# Patient Record
Sex: FEMALE | Race: WHITE | NOT HISPANIC OR LATINO | Employment: OTHER | URBAN - METROPOLITAN AREA
[De-identification: names, ages, dates, MRNs, and addresses within clinical notes are randomized per-mention and may not be internally consistent; named-entity substitution may affect disease eponyms.]

---

## 2024-05-19 NOTE — PROGRESS NOTES
Transfer Type: [facility]  Transfer Triage Note    Date of call: 05/19/24  Time of call: 2:43 PM    Current Patient Location:  [facility]  Current Level of Care: ED    Vitals:                      at    Diagnosis: Acute angle closure, clot in pupil, increased ocular pressure  Reason for requested transfer: Further diagnostic work up, management, and consultation for specialized care   Isolation Needs: None    Care everywhere has been updated and reviewed: No, Lm will work on it  Necessary images have been sent through PACS: No    If patient is transferring for specialty care or specific procedure, the specialist required has participated in the transfer call and agreed with need for transfer and anticipated timeline: Yes, Provider name: Dr. Pompa specialty with: Ophtho    Transfer accepted: Yes  Stability of Patient: Patient is vitally stable, with no critical labs, and will likely remain stable throughout the transfer process  Is the patient appropriate for [facility]? Yes  Level of Care Needed: Med Surg  Telemetry Needed:  Med (Remote) Telemetry, asymptomatic sinus sachin and on diuretic  Expected Time of Arrival for Transfer: 0-8 hours  Arrival Location:  [facility]     Recommendations for Management and Stabilization: Given    Additional Comments: 78 year old woman who was operated by [facility]. Got vitrectomy 5/17/2024, discharged 5/18/24. Started throwing up on ride back to [place] and lost vision in L eye. Dr. Colvin was then called and told patient to come into ED. In the ED reports no vision out of left eye and IOP measured at 90. Recs for transfer to [facility]. Per outside provider Dr. Colvin would like to take to ER (has privileges at [facility]). Has received 500mg of diamox, needs another dose at 1930 5/19/2024, doing q8hrs. Also got pred 40mg and 1 drop of atropine, cosopt, brimonidine (3 doses for both of the  last two). Discussed with optho on call (Dr. Pompa) who is aware of transfer and okay with transfer. Will send to [facility] with medicine primary and ophtho consult on arrival.     Paulino Hawk MD    Addendum:  Received update from SOC that pt will have transport set up in AM on 5/20.    Mami Hirsch MD

## 2024-05-20 ENCOUNTER — ANESTHESIA EVENT (OUTPATIENT)
Dept: SURGERY | Facility: CLINIC | Age: 79
DRG: 116 | End: 2024-05-20
Payer: MEDICARE

## 2024-05-20 ENCOUNTER — HOSPITAL ENCOUNTER (INPATIENT)
Facility: CLINIC | Age: 79
LOS: 2 days | Discharge: HOME | DRG: 116 | End: 2024-05-22
Attending: STUDENT IN AN ORGANIZED HEALTH CARE EDUCATION/TRAINING PROGRAM | Admitting: FAMILY MEDICINE
Payer: MEDICARE

## 2024-05-20 ENCOUNTER — ANESTHESIA (OUTPATIENT)
Dept: SURGERY | Facility: CLINIC | Age: 79
DRG: 116 | End: 2024-05-20
Payer: MEDICARE

## 2024-05-20 DIAGNOSIS — H31.302 SUPRACHOROIDAL HEMORRHAGE OF LEFT EYE: ICD-10-CM

## 2024-05-20 PROBLEM — H40.20X0 ANGLE-CLOSURE GLAUCOMA: Status: ACTIVE | Noted: 2024-05-20

## 2024-05-20 LAB
ALBUMIN SERPL BCG-MCNC: 3.8 G/DL (ref 3.5–5.2)
ALP SERPL-CCNC: 69 U/L (ref 40–150)
ALT SERPL W P-5'-P-CCNC: 19 U/L (ref 0–50)
ANION GAP SERPL CALCULATED.3IONS-SCNC: 10 MMOL/L (ref 7–15)
AST SERPL W P-5'-P-CCNC: 23 U/L (ref 0–45)
BILIRUB SERPL-MCNC: 0.4 MG/DL
BUN SERPL-MCNC: 16.4 MG/DL (ref 8–23)
CALCIUM SERPL-MCNC: 9.3 MG/DL (ref 8.8–10.2)
CHLORIDE SERPL-SCNC: 107 MMOL/L (ref 98–107)
CREAT SERPL-MCNC: 0.63 MG/DL (ref 0.51–0.95)
DEPRECATED HCO3 PLAS-SCNC: 20 MMOL/L (ref 22–29)
EGFRCR SERPLBLD CKD-EPI 2021: 89 ML/MIN/1.73M2
ERYTHROCYTE [DISTWIDTH] IN BLOOD BY AUTOMATED COUNT: 13.6 % (ref 10–15)
GLUCOSE SERPL-MCNC: 114 MG/DL (ref 70–99)
HCT VFR BLD AUTO: 40.7 % (ref 35–47)
HGB BLD-MCNC: 13.3 G/DL (ref 11.7–15.7)
MCH RBC QN AUTO: 31.5 PG (ref 26.5–33)
MCHC RBC AUTO-ENTMCNC: 32.7 G/DL (ref 31.5–36.5)
MCV RBC AUTO: 96 FL (ref 78–100)
PLATELET # BLD AUTO: 262 10E3/UL (ref 150–450)
POTASSIUM SERPL-SCNC: 4 MMOL/L (ref 3.4–5.3)
PROT SERPL-MCNC: 6.4 G/DL (ref 6.4–8.3)
RBC # BLD AUTO: 4.22 10E6/UL (ref 3.8–5.2)
SODIUM SERPL-SCNC: 137 MMOL/L (ref 135–145)
WBC # BLD AUTO: 9 10E3/UL (ref 4–11)

## 2024-05-20 PROCEDURE — 120N000002 HC R&B MED SURG/OB UMMC

## 2024-05-20 PROCEDURE — 93010 ELECTROCARDIOGRAM REPORT: CPT | Performed by: INTERNAL MEDICINE

## 2024-05-20 PROCEDURE — 99100 ANES PT EXTEME AGE<1 YR&>70: CPT | Performed by: ANESTHESIOLOGY

## 2024-05-20 PROCEDURE — 250N000025 HC SEVOFLURANE, PER MIN: Performed by: OPHTHALMOLOGY

## 2024-05-20 PROCEDURE — 258N000003 HC RX IP 258 OP 636

## 2024-05-20 PROCEDURE — 370N000017 HC ANESTHESIA TECHNICAL FEE, PER MIN: Performed by: OPHTHALMOLOGY

## 2024-05-20 PROCEDURE — 08B53ZZ EXCISION OF LEFT VITREOUS, PERCUTANEOUS APPROACH: ICD-10-PCS | Performed by: STUDENT IN AN ORGANIZED HEALTH CARE EDUCATION/TRAINING PROGRAM

## 2024-05-20 PROCEDURE — 65815 DRAINAGE OF EYE: CPT | Mod: LT | Performed by: OPHTHALMOLOGY

## 2024-05-20 PROCEDURE — C1814 RETINAL TAMP, SILICONE OIL: HCPCS | Performed by: OPHTHALMOLOGY

## 2024-05-20 PROCEDURE — 08933ZZ DRAINAGE OF LEFT ANTERIOR CHAMBER, PERCUTANEOUS APPROACH: ICD-10-PCS | Performed by: STUDENT IN AN ORGANIZED HEALTH CARE EDUCATION/TRAINING PROGRAM

## 2024-05-20 PROCEDURE — 250N000012 HC RX MED GY IP 250 OP 636 PS 637: Performed by: STUDENT IN AN ORGANIZED HEALTH CARE EDUCATION/TRAINING PROGRAM

## 2024-05-20 PROCEDURE — 250N000011 HC RX IP 250 OP 636: Performed by: OPHTHALMOLOGY

## 2024-05-20 PROCEDURE — 250N000013 HC RX MED GY IP 250 OP 250 PS 637: Performed by: STUDENT IN AN ORGANIZED HEALTH CARE EDUCATION/TRAINING PROGRAM

## 2024-05-20 PROCEDURE — 67108 REPAIR DETACHED RETINA: CPT | Mod: LT | Performed by: OPHTHALMOLOGY

## 2024-05-20 PROCEDURE — 80053 COMPREHEN METABOLIC PANEL: CPT

## 2024-05-20 PROCEDURE — 99222 1ST HOSP IP/OBS MODERATE 55: CPT | Mod: AI

## 2024-05-20 PROCEDURE — 67036 REMOVAL OF INNER EYE FLUID: CPT | Performed by: NURSE ANESTHETIST, CERTIFIED REGISTERED

## 2024-05-20 PROCEDURE — 272N000001 HC OR GENERAL SUPPLY STERILE: Performed by: OPHTHALMOLOGY

## 2024-05-20 PROCEDURE — 250N000009 HC RX 250: Performed by: OPHTHALMOLOGY

## 2024-05-20 PROCEDURE — 250N000011 HC RX IP 250 OP 636: Performed by: NURSE ANESTHETIST, CERTIFIED REGISTERED

## 2024-05-20 PROCEDURE — 93005 ELECTROCARDIOGRAM TRACING: CPT

## 2024-05-20 PROCEDURE — 272N000002 HC OR SUPPLY OTHER OPNP: Performed by: OPHTHALMOLOGY

## 2024-05-20 PROCEDURE — 36415 COLL VENOUS BLD VENIPUNCTURE: CPT

## 2024-05-20 PROCEDURE — 360N000077 HC SURGERY LEVEL 4, PER MIN: Performed by: OPHTHALMOLOGY

## 2024-05-20 PROCEDURE — 710N000010 HC RECOVERY PHASE 1, LEVEL 2, PER MIN: Performed by: OPHTHALMOLOGY

## 2024-05-20 PROCEDURE — 258N000003 HC RX IP 258 OP 636: Performed by: NURSE ANESTHETIST, CERTIFIED REGISTERED

## 2024-05-20 PROCEDURE — 250N000011 HC RX IP 250 OP 636: Performed by: STUDENT IN AN ORGANIZED HEALTH CARE EDUCATION/TRAINING PROGRAM

## 2024-05-20 PROCEDURE — 85027 COMPLETE CBC AUTOMATED: CPT

## 2024-05-20 PROCEDURE — 99207 PR NO BILLABLE SERVICE THIS VISIT: CPT | Performed by: STUDENT IN AN ORGANIZED HEALTH CARE EDUCATION/TRAINING PROGRAM

## 2024-05-20 PROCEDURE — 250N000009 HC RX 250: Performed by: NURSE ANESTHETIST, CERTIFIED REGISTERED

## 2024-05-20 PROCEDURE — 99100 ANES PT EXTEME AGE<1 YR&>70: CPT | Performed by: NURSE ANESTHETIST, CERTIFIED REGISTERED

## 2024-05-20 PROCEDURE — 999N000141 HC STATISTIC PRE-PROCEDURE NURSING ASSESSMENT: Performed by: OPHTHALMOLOGY

## 2024-05-20 PROCEDURE — 67036 REMOVAL OF INNER EYE FLUID: CPT | Performed by: ANESTHESIOLOGY

## 2024-05-20 DEVICE — SILIKON™ 1000 (PURIFIED POLYDIMETHYLSILOXANE) IS HIGHLY PURIFIED LONG CHAIN POLYDIMETHYLSILOXANE TRIMETHYLSILOXY TERMINATED SILICONE OIL. IT IS STERILE,NON-PYROGENIC, CLEAR, COLORLESS AND HAS A VISCOSITY OF 1000 CS. FOR USE AS A POST-OPERATIVE RETINAL TAMPONADE DURING VITREORETINAL SURGERY. SILIKON™ 1000IS COMPOSED OF SILICON, OXYGEN, CARBON AND HYDROGEN ATOMS. SILIKON™ 1000 IS IMMISCIBLE WITH AQUEOUS COMPONENTS AND IS RELATIVELY INERT MATERIAL WITHLITTLE BIOLOGICAL TOXICITY POTENTIAL.
Type: IMPLANTABLE DEVICE | Site: EYE | Status: FUNCTIONAL
Brand: SILIKON™

## 2024-05-20 RX ORDER — SODIUM CHLORIDE, SODIUM LACTATE, POTASSIUM CHLORIDE, CALCIUM CHLORIDE 600; 310; 30; 20 MG/100ML; MG/100ML; MG/100ML; MG/100ML
INJECTION, SOLUTION INTRAVENOUS CONTINUOUS PRN
Status: DISCONTINUED | OUTPATIENT
Start: 2024-05-20 | End: 2024-05-20

## 2024-05-20 RX ORDER — ACETAZOLAMIDE 500 MG/5ML
500 INJECTION, POWDER, LYOPHILIZED, FOR SOLUTION INTRAVENOUS EVERY 12 HOURS
Status: DISCONTINUED | OUTPATIENT
Start: 2024-05-21 | End: 2024-05-20

## 2024-05-20 RX ORDER — HYDROMORPHONE HYDROCHLORIDE 1 MG/ML
0.4 INJECTION, SOLUTION INTRAMUSCULAR; INTRAVENOUS; SUBCUTANEOUS EVERY 5 MIN PRN
Status: DISCONTINUED | OUTPATIENT
Start: 2024-05-20 | End: 2024-05-20 | Stop reason: HOSPADM

## 2024-05-20 RX ORDER — PREDNISONE 20 MG/1
40 TABLET ORAL DAILY
Status: DISCONTINUED | OUTPATIENT
Start: 2024-05-20 | End: 2024-05-20

## 2024-05-20 RX ORDER — KETOCONAZOLE 20 MG/ML
SHAMPOO TOPICAL DAILY
Status: DISCONTINUED | OUTPATIENT
Start: 2024-05-21 | End: 2024-05-21

## 2024-05-20 RX ORDER — LATANOPROST 50 UG/ML
1 SOLUTION/ DROPS OPHTHALMIC AT BEDTIME
Status: DISCONTINUED | OUTPATIENT
Start: 2024-05-20 | End: 2024-05-21

## 2024-05-20 RX ORDER — SODIUM CHLORIDE, SODIUM LACTATE, POTASSIUM CHLORIDE, CALCIUM CHLORIDE 600; 310; 30; 20 MG/100ML; MG/100ML; MG/100ML; MG/100ML
INJECTION, SOLUTION INTRAVENOUS CONTINUOUS
Status: DISCONTINUED | OUTPATIENT
Start: 2024-05-20 | End: 2024-05-20

## 2024-05-20 RX ORDER — PROCHLORPERAZINE 25 MG
12.5 SUPPOSITORY, RECTAL RECTAL EVERY 12 HOURS PRN
Status: DISCONTINUED | OUTPATIENT
Start: 2024-05-20 | End: 2024-05-22 | Stop reason: HOSPADM

## 2024-05-20 RX ORDER — HYDRALAZINE HYDROCHLORIDE 20 MG/ML
2.5-5 INJECTION INTRAMUSCULAR; INTRAVENOUS EVERY 10 MIN PRN
Status: DISCONTINUED | OUTPATIENT
Start: 2024-05-20 | End: 2024-05-20 | Stop reason: HOSPADM

## 2024-05-20 RX ORDER — ACETAMINOPHEN 650 MG/1
650 SUPPOSITORY RECTAL EVERY 4 HOURS PRN
Status: DISCONTINUED | OUTPATIENT
Start: 2024-05-20 | End: 2024-05-22 | Stop reason: HOSPADM

## 2024-05-20 RX ORDER — SODIUM CHLORIDE, SODIUM LACTATE, POTASSIUM CHLORIDE, CALCIUM CHLORIDE 600; 310; 30; 20 MG/100ML; MG/100ML; MG/100ML; MG/100ML
INJECTION, SOLUTION INTRAVENOUS CONTINUOUS
Status: DISCONTINUED | OUTPATIENT
Start: 2024-05-20 | End: 2024-05-20 | Stop reason: HOSPADM

## 2024-05-20 RX ORDER — FEXOFENADINE HCL 60 MG/1
60 TABLET, FILM COATED ORAL DAILY PRN
Status: DISCONTINUED | OUTPATIENT
Start: 2024-05-20 | End: 2024-05-22 | Stop reason: HOSPADM

## 2024-05-20 RX ORDER — FENTANYL CITRATE 50 UG/ML
INJECTION, SOLUTION INTRAMUSCULAR; INTRAVENOUS PRN
Status: DISCONTINUED | OUTPATIENT
Start: 2024-05-20 | End: 2024-05-20

## 2024-05-20 RX ORDER — APREPITANT 40 MG/1
40 CAPSULE ORAL ONCE
Status: COMPLETED | OUTPATIENT
Start: 2024-05-20 | End: 2024-05-20

## 2024-05-20 RX ORDER — HYDROMORPHONE HYDROCHLORIDE 1 MG/ML
0.2 INJECTION, SOLUTION INTRAMUSCULAR; INTRAVENOUS; SUBCUTANEOUS EVERY 5 MIN PRN
Status: DISCONTINUED | OUTPATIENT
Start: 2024-05-20 | End: 2024-05-20 | Stop reason: HOSPADM

## 2024-05-20 RX ORDER — ACETAMINOPHEN 325 MG/1
975 TABLET ORAL ONCE
Status: COMPLETED | OUTPATIENT
Start: 2024-05-20 | End: 2024-05-20

## 2024-05-20 RX ORDER — BRIMONIDINE TARTRATE 2 MG/ML
1 SOLUTION/ DROPS OPHTHALMIC 3 TIMES DAILY
Status: DISCONTINUED | OUTPATIENT
Start: 2024-05-20 | End: 2024-05-21

## 2024-05-20 RX ORDER — ACETAZOLAMIDE 500 MG/5ML
500 INJECTION, POWDER, LYOPHILIZED, FOR SOLUTION INTRAVENOUS EVERY 12 HOURS
Status: DISCONTINUED | OUTPATIENT
Start: 2024-05-20 | End: 2024-05-21

## 2024-05-20 RX ORDER — ONDANSETRON 2 MG/ML
4 INJECTION INTRAMUSCULAR; INTRAVENOUS EVERY 6 HOURS PRN
Status: DISCONTINUED | OUTPATIENT
Start: 2024-05-20 | End: 2024-05-22 | Stop reason: HOSPADM

## 2024-05-20 RX ORDER — CALCIUM CARBONATE 500 MG/1
1000 TABLET, CHEWABLE ORAL 4 TIMES DAILY PRN
Status: DISCONTINUED | OUTPATIENT
Start: 2024-05-20 | End: 2024-05-22 | Stop reason: HOSPADM

## 2024-05-20 RX ORDER — LIDOCAINE 40 MG/G
CREAM TOPICAL
Status: DISCONTINUED | OUTPATIENT
Start: 2024-05-20 | End: 2024-05-22 | Stop reason: HOSPADM

## 2024-05-20 RX ORDER — DORZOLAMIDE HYDROCHLORIDE AND TIMOLOL MALEATE 20; 5 MG/ML; MG/ML
1 SOLUTION/ DROPS OPHTHALMIC 2 TIMES DAILY
Status: DISCONTINUED | OUTPATIENT
Start: 2024-05-20 | End: 2024-05-21

## 2024-05-20 RX ORDER — BALANCED SALT SOLUTION 6.4; .75; .48; .3; 3.9; 1.7 MG/ML; MG/ML; MG/ML; MG/ML; MG/ML; MG/ML
SOLUTION OPHTHALMIC PRN
Status: DISCONTINUED | OUTPATIENT
Start: 2024-05-20 | End: 2024-05-20 | Stop reason: HOSPADM

## 2024-05-20 RX ORDER — ACETAZOLAMIDE 500 MG/5ML
500 INJECTION, POWDER, LYOPHILIZED, FOR SOLUTION INTRAVENOUS EVERY 8 HOURS
Status: DISCONTINUED | OUTPATIENT
Start: 2024-05-20 | End: 2024-05-20

## 2024-05-20 RX ORDER — PROCHLORPERAZINE MALEATE 5 MG
5 TABLET ORAL EVERY 6 HOURS PRN
Status: DISCONTINUED | OUTPATIENT
Start: 2024-05-20 | End: 2024-05-22 | Stop reason: HOSPADM

## 2024-05-20 RX ORDER — LABETALOL HYDROCHLORIDE 5 MG/ML
10 INJECTION, SOLUTION INTRAVENOUS
Status: DISCONTINUED | OUTPATIENT
Start: 2024-05-20 | End: 2024-05-20 | Stop reason: HOSPADM

## 2024-05-20 RX ORDER — NALOXONE HYDROCHLORIDE 0.4 MG/ML
0.1 INJECTION, SOLUTION INTRAMUSCULAR; INTRAVENOUS; SUBCUTANEOUS
Status: DISCONTINUED | OUTPATIENT
Start: 2024-05-20 | End: 2024-05-20 | Stop reason: HOSPADM

## 2024-05-20 RX ORDER — ATROPINE SULFATE 10 MG/ML
1 SOLUTION/ DROPS OPHTHALMIC DAILY
Status: DISCONTINUED | OUTPATIENT
Start: 2024-05-20 | End: 2024-05-20

## 2024-05-20 RX ORDER — ONDANSETRON 2 MG/ML
4 INJECTION INTRAMUSCULAR; INTRAVENOUS EVERY 30 MIN PRN
Status: DISCONTINUED | OUTPATIENT
Start: 2024-05-20 | End: 2024-05-20 | Stop reason: HOSPADM

## 2024-05-20 RX ORDER — PROPOFOL 10 MG/ML
INJECTION, EMULSION INTRAVENOUS CONTINUOUS PRN
Status: DISCONTINUED | OUTPATIENT
Start: 2024-05-20 | End: 2024-05-20

## 2024-05-20 RX ORDER — LIDOCAINE 40 MG/G
CREAM TOPICAL
Status: DISCONTINUED | OUTPATIENT
Start: 2024-05-20 | End: 2024-05-20 | Stop reason: HOSPADM

## 2024-05-20 RX ORDER — LIDOCAINE HYDROCHLORIDE 20 MG/ML
INJECTION, SOLUTION INFILTRATION; PERINEURAL PRN
Status: DISCONTINUED | OUTPATIENT
Start: 2024-05-20 | End: 2024-05-20

## 2024-05-20 RX ORDER — ATROPINE SULFATE 10 MG/ML
SOLUTION/ DROPS OPHTHALMIC PRN
Status: DISCONTINUED | OUTPATIENT
Start: 2024-05-20 | End: 2024-05-22 | Stop reason: HOSPADM

## 2024-05-20 RX ORDER — AMOXICILLIN 250 MG
1 CAPSULE ORAL 2 TIMES DAILY PRN
Status: DISCONTINUED | OUTPATIENT
Start: 2024-05-20 | End: 2024-05-22 | Stop reason: HOSPADM

## 2024-05-20 RX ORDER — FENTANYL CITRATE 50 UG/ML
50 INJECTION, SOLUTION INTRAMUSCULAR; INTRAVENOUS EVERY 5 MIN PRN
Status: DISCONTINUED | OUTPATIENT
Start: 2024-05-20 | End: 2024-05-20 | Stop reason: HOSPADM

## 2024-05-20 RX ORDER — AMOXICILLIN 250 MG
2 CAPSULE ORAL 2 TIMES DAILY PRN
Status: DISCONTINUED | OUTPATIENT
Start: 2024-05-20 | End: 2024-05-22 | Stop reason: HOSPADM

## 2024-05-20 RX ORDER — PROPOFOL 10 MG/ML
INJECTION, EMULSION INTRAVENOUS PRN
Status: DISCONTINUED | OUTPATIENT
Start: 2024-05-20 | End: 2024-05-20

## 2024-05-20 RX ORDER — ONDANSETRON 2 MG/ML
4 INJECTION INTRAMUSCULAR; INTRAVENOUS ONCE
Status: COMPLETED | OUTPATIENT
Start: 2024-05-20 | End: 2024-05-20

## 2024-05-20 RX ORDER — DEXAMETHASONE SODIUM PHOSPHATE 4 MG/ML
4 INJECTION, SOLUTION INTRA-ARTICULAR; INTRALESIONAL; INTRAMUSCULAR; INTRAVENOUS; SOFT TISSUE
Status: DISCONTINUED | OUTPATIENT
Start: 2024-05-20 | End: 2024-05-20 | Stop reason: HOSPADM

## 2024-05-20 RX ORDER — FENTANYL CITRATE 50 UG/ML
25 INJECTION, SOLUTION INTRAMUSCULAR; INTRAVENOUS EVERY 5 MIN PRN
Status: DISCONTINUED | OUTPATIENT
Start: 2024-05-20 | End: 2024-05-20 | Stop reason: HOSPADM

## 2024-05-20 RX ORDER — ACETAMINOPHEN 325 MG/1
650 TABLET ORAL EVERY 4 HOURS PRN
Status: DISCONTINUED | OUTPATIENT
Start: 2024-05-20 | End: 2024-05-22 | Stop reason: HOSPADM

## 2024-05-20 RX ORDER — KETOCONAZOLE 20 MG/G
CREAM TOPICAL DAILY
Status: DISCONTINUED | OUTPATIENT
Start: 2024-05-21 | End: 2024-05-21

## 2024-05-20 RX ORDER — ONDANSETRON 4 MG/1
4 TABLET, ORALLY DISINTEGRATING ORAL EVERY 30 MIN PRN
Status: DISCONTINUED | OUTPATIENT
Start: 2024-05-20 | End: 2024-05-20 | Stop reason: HOSPADM

## 2024-05-20 RX ORDER — DEXAMETHASONE SODIUM PHOSPHATE 4 MG/ML
INJECTION, SOLUTION INTRA-ARTICULAR; INTRALESIONAL; INTRAMUSCULAR; INTRAVENOUS; SOFT TISSUE PRN
Status: DISCONTINUED | OUTPATIENT
Start: 2024-05-20 | End: 2024-05-20

## 2024-05-20 RX ORDER — GLYCOPYRROLATE 0.2 MG/ML
INJECTION, SOLUTION INTRAMUSCULAR; INTRAVENOUS PRN
Status: DISCONTINUED | OUTPATIENT
Start: 2024-05-20 | End: 2024-05-20

## 2024-05-20 RX ORDER — ONDANSETRON 4 MG/1
4 TABLET, ORALLY DISINTEGRATING ORAL EVERY 6 HOURS PRN
Status: DISCONTINUED | OUTPATIENT
Start: 2024-05-20 | End: 2024-05-22 | Stop reason: HOSPADM

## 2024-05-20 RX ADMIN — GLYCOPYRROLATE 0.3 MG: 0.2 INJECTION, SOLUTION INTRAMUSCULAR; INTRAVENOUS at 19:50

## 2024-05-20 RX ADMIN — LATANOPROST 1 DROP: 50 SOLUTION OPHTHALMIC at 17:36

## 2024-05-20 RX ADMIN — PROPOFOL 50 MG: 10 INJECTION, EMULSION INTRAVENOUS at 19:42

## 2024-05-20 RX ADMIN — FENTANYL CITRATE 50 MCG: 50 INJECTION INTRAMUSCULAR; INTRAVENOUS at 19:41

## 2024-05-20 RX ADMIN — SODIUM CHLORIDE, POTASSIUM CHLORIDE, SODIUM LACTATE AND CALCIUM CHLORIDE 500 ML: 600; 310; 30; 20 INJECTION, SOLUTION INTRAVENOUS at 17:36

## 2024-05-20 RX ADMIN — FENTANYL CITRATE 25 MCG: 50 INJECTION INTRAMUSCULAR; INTRAVENOUS at 20:54

## 2024-05-20 RX ADMIN — DEXAMETHASONE SODIUM PHOSPHATE 4 MG: 4 INJECTION, SOLUTION INTRA-ARTICULAR; INTRALESIONAL; INTRAMUSCULAR; INTRAVENOUS; SOFT TISSUE at 20:00

## 2024-05-20 RX ADMIN — PROPOFOL 50 MG: 10 INJECTION, EMULSION INTRAVENOUS at 19:43

## 2024-05-20 RX ADMIN — LIDOCAINE HYDROCHLORIDE 100 MG: 20 INJECTION, SOLUTION INFILTRATION; PERINEURAL at 19:41

## 2024-05-20 RX ADMIN — SODIUM CHLORIDE, POTASSIUM CHLORIDE, SODIUM LACTATE AND CALCIUM CHLORIDE: 600; 310; 30; 20 INJECTION, SOLUTION INTRAVENOUS at 18:46

## 2024-05-20 RX ADMIN — PROPOFOL 20 MCG/KG/MIN: 10 INJECTION, EMULSION INTRAVENOUS at 19:59

## 2024-05-20 RX ADMIN — ACETAMINOPHEN 975 MG: 325 TABLET, FILM COATED ORAL at 18:45

## 2024-05-20 RX ADMIN — APREPITANT 40 MG: 40 CAPSULE ORAL at 18:45

## 2024-05-20 RX ADMIN — ONDANSETRON 4 MG: 2 INJECTION INTRAMUSCULAR; INTRAVENOUS at 21:55

## 2024-05-20 ASSESSMENT — COLUMBIA-SUICIDE SEVERITY RATING SCALE - C-SSRS
6. HAVE YOU EVER DONE ANYTHING, STARTED TO DO ANYTHING, OR PREPARED TO DO ANYTHING TO END YOUR LIFE?: NO
1. IN THE PAST MONTH, HAVE YOU WISHED YOU WERE DEAD OR WISHED YOU COULD GO TO SLEEP AND NOT WAKE UP?: NO
2. HAVE YOU ACTUALLY HAD ANY THOUGHTS OF KILLING YOURSELF IN THE PAST MONTH?: NO

## 2024-05-20 ASSESSMENT — ACTIVITIES OF DAILY LIVING (ADL)
ADLS_ACUITY_SCORE: 20
ADLS_ACUITY_SCORE: 35
ADLS_ACUITY_SCORE: 20
ADLS_ACUITY_SCORE: 21
ADLS_ACUITY_SCORE: 20

## 2024-05-20 ASSESSMENT — LIFESTYLE VARIABLES: TOBACCO_USE: 0

## 2024-05-20 ASSESSMENT — TONOMETRY: OS_IOP_MMHG: 90

## 2024-05-20 ASSESSMENT — ENCOUNTER SYMPTOMS
DYSRHYTHMIAS: 1
SEIZURES: 0

## 2024-05-20 NOTE — PLAN OF CARE
"[id number]    Plan of Care Reviewed With: patient    Overall Patient Progress: no change    Outcome Evaluation: Pt is A & O x4 on RA. Denies pain, nausea, chest pain & SOB. New R PIV placed intermittently infusing LR bolus otherwise SL. Pt is SBA d/t L eye vision loss, voids spontaneously, and uses call light appropriately.    Shift Updates  Pt is NPO.  Pt placed on continuous tele monitoring per new order.  1643- Tele monitoring alarming HR 39 for 2 seconds - per orders, MD notified and aware. New order for 12 lead EKG placed.  Tele removed for pt to go down to pre-op.  Pt did not have IV access upon arrival to unit - RN flyer contacted for one to be placed.  Unable to administer Diamox prior to pt leaving for pre-op as it was unavailable - re-timed administration time.    Vitals: /43 (BP Location: Right arm)   Pulse (!) 44   Temp 98.3  F (36.8  C) (Oral)   Resp 16   Ht 1.626 m (5' 4\")   Wt 56.3 kg (124 lb 1.9 oz)   SpO2 97%   BMI 21.30 kg/m      Plan: TBD. Continue with plan of care.    1810- Report given to pre-op nurse. Pre-op labs obtained. Unable to complete CHG bath prior to. Pt voided prior to going down to pre-op    "

## 2024-05-20 NOTE — LETTER
Transition Communication Hand-off for Care Transitions to Next Level of Care Provider    Name: Mary Harding  : 1945  MRN #: [id number]  Primary Care Provider: Griffin Schwab     Primary Clinic: [facility] [address] [place] [id number]     Reason for Hospitalization:  increased ocular pressure, acute angle closure, clot in pupil  Angle-closure glaucoma  Admit Date/Time: 2024  2:42 PM  Discharge Date: 24  Payor Source: Payor: MEDICARE / Plan: MEDICARE / Product Type: Medicare /     Reason for Communication Hand-off Referral: Other Continuation of Care    Discharge Plan: Discharge to Hotel until she can return home.    Concern for non-adherence with plan of care:   Y/N No  Discharge Needs Assessment:  Needs      Flowsheet Row Most Recent Value   Equipment Currently Used at Home none          Follow-up specialty is recommended: Yes    Follow-up plan:    Future Appointments   Date Time Provider Department Center   2024  9:00 AM Gurwinder Garcia [facility]     Any outstanding tests or procedures:        Key Recommendations:      BASSAM Barber    AVS/Discharge Summary is the source of truth; this is a helpful guide for improved communication of patient story

## 2024-05-20 NOTE — CONSULTS
OPHTHALMOLOGY CONSULT NOTE  05/20/24    Patient: Mary Harding      ASSESSMENT/PLAN:   #Gas bubble expansion, left eye  Mary Harding is a 78 year old female who presented with a left eye increased IOP, lens and iridocorneal touch after undergoing vitrectomy for macular hole repair on 5/17/24. She experienced complete vision loss, eye pain, and nausea the day after surgery. No vision in this eye for the past 48 hours. Transferred from [place], [place] for evaluation.    Vision is no light perception in the left eye. IOP is unreadable with tonopen. The AC is completely flat with iris-cornea touch. B-scan shows decreased signal due to air in vitreous cavity     Exam is consistent with gas bubble expansion vs suprachoroidal hemorrhage (less likely given the b scan and exam findings) in the left eye. Discussed with patient poor prognosis of this condition. She would like to do everything possible to regain vision in the left eye.    Recommendations:  - NPO (has not eaten since yesterday at 4:30 pm)  - go to OR emergently for PPV with Dr. Espinoza  - start IV Diamox 500 mg BID  - start Latanoprost at bedtime left eye  - start Cosopt BID left eye  - start Brimonidine TID left eye      #Amblyopia, right eye  - since childhood. Unsure of best corrected vision. Treated with patching as a child. No history of strabismus surgery.    #Dry age-related macular degeneration, both eyes  - on AREDS  - right eye with moderate to severe amount of drusen on dilated exam    #Pseudophakia, bilateral      It is our pleasure to participate in this patient's care and treatment. Please contact us with any further questions or concerns.    Discussed with Retina fellow Dr. Dg Khoury and Staff Dr. Espinoza who agreed with this assessment and plan.     Nicky Benitez MD   PGY-3 Ophthalmology resident    HISTORY OF PRESENTING ILLNESS:     Mary Harding is a 78 year old female who presented on 5/20/24 with left eye decreased vision and  pain. She underwent a vitrectomy on Friday 5/17/24 with Dr. Hi at [facility] for left eye macular hole repair. She did well overnight and then was seen on POD1 in the morning and then while she was driving back to [facility] she experienced worsening left eye pain, decreased vision, and nausea. The last time she had any light perception was on Saturday afternoon at 3pm. She then stayed at home overnight but her pain was not improving and she went to the ER in [facility] where her IOP was elevated to 90. She was admitted and started on topical drops and IV Diamox. Her eye pain resolved mostly until late this afternoon it started to come back. She last got Diamox this morning around 8 or 9 per patient. She last ate yesterday at 4:30 pm and last drank this morning around 8 am.      10+ review of systems were otherwise negative except for that which has been stated above.      OCULAR/MEDICAL/SURGICAL HISTORIES:     Past Ocular History: Amblyopia right eye due to strabismus (no hx of strab surgery), pseudophakia each eye, dry AMD on AREDS  Eye Drops: none prior to macular hole repair  Pertinent Systemic Medications:   Current Facility-Administered Medications   Medication Dose Route Frequency Provider Last Rate Last Admin    acetaminophen (TYLENOL) tablet 650 mg  650 mg Oral Q4H PRN Mireya Vizcarra DO        Or    acetaminophen (TYLENOL) Suppository 650 mg  650 mg Rectal Q4H PRN Mireya Vizcarra DO        acetaZOLAMIDE (DIAMOX) injection 500 mg  500 mg Intravenous Q12H Mireya Vizcarra DO        brimonidine (ALPHAGAN) 0.2 % ophthalmic solution 1 drop  1 drop Left Eye TID Mireya Vizcarra DO        calcium carbonate (TUMS) chewable tablet 1,000 mg  1,000 mg Oral 4x Daily PRN Mireya Vizcarra DO        dorzolamide-timolol (COSOPT) ophthalmic solution 1 drop  1 drop Left Eye BID Mireya Vizcarra DO        fexofenadine (ALLEGRA) tablet 60 mg  60 mg Oral Daily PRN Mireya Vizcarra DO        lactated ringers BOLUS 500 mL  500 mL  Intravenous Once Mireya Vizcarra  mL/hr at 05/20/24 1736 500 mL at 05/20/24 1736    latanoprost (XALATAN) 0.005 % ophthalmic solution 1 drop  1 drop Left Eye At Bedtime Viviana Benitez MD   1 drop at 05/20/24 1736    lidocaine (LMX4) cream   Topical Q1H PRN Mireya Vizcarra DO        lidocaine 1 % 0.1-1 mL  0.1-1 mL Other Q1H PRN Mireya Vizcarra DO        No Medication Sleep Aids for this Patient   Does not apply Continuous PRN Mireya Vizcarra DO        ondansetron (ZOFRAN ODT) ODT tab 4 mg  4 mg Oral Q6H PRN Mireya Vizcarra DO        Or    ondansetron (ZOFRAN) injection 4 mg  4 mg Intravenous Q6H PRN Mireya Vizcarra DO        prochlorperazine (COMPAZINE) injection 5 mg  5 mg Intravenous Q6H PRN Mireya Vizcarra DO        Or    prochlorperazine (COMPAZINE) tablet 5 mg  5 mg Oral Q6H PRN Mireya Vizcarra DO        Or    prochlorperazine (COMPAZINE) suppository 12.5 mg  12.5 mg Rectal Q12H PRN Mireya Vizcarra DO        senna-docusate (SENOKOT-S/PERICOLACE) 8.6-50 MG per tablet 1 tablet  1 tablet Oral BID PRN Mireya Vizcarra DO        Or    senna-docusate (SENOKOT-S/PERICOLACE) 8.6-50 MG per tablet 2 tablet  2 tablet Oral BID PRN Mireya Vizcarra DO        sodium chloride (PF) 0.9% PF flush 3 mL  3 mL Intracatheter Q8H Mireya Vizcarra DO        sodium chloride (PF) 0.9% PF flush 3 mL  3 mL Intracatheter q1 min prn Mireya Vizcarra DO         Not on blood thinners      Past Medical History:  Allergies    Past Surgical History:   Macular hole repair PPV 5/17/24  Cataract surgery both eyes    Family History:  Mother with glaucoma    Social History: lives in [facility], takes care of herself, friend Lakeisha is first contact    EXAMINATION:     Base Eye Exam       Visual Acuity (Snellen - Linear)         Right Left    Near cc 20/60 NLP              Tonometry (Tonopen, 4:26 PM)         Right Left    Pressure 16 error              Pupils         APD    Right     Left Yes by reverse              Visual Fields  (Counting fingers)         Left Right      Full    Restrictions Total superior temporal, inferior temporal, superior nasal, inferior nasal deficiencies               Extraocular Movement         Right Left     -1 -2 -2   -1  -2   -1 -- -1    -3 -3 -3   -3  -3   -3 -3 -3                 Neuro/Psych       Oriented x3: Yes    Mood/Affect: Normal              Dilation       Right eye: 2.5% Camilo Synephrine, 1.0% Mydriacyl @ 5:07 PM                  Slit Lamp and Fundus Exam       External Exam         Right Left    External Normal Normal              Slit Lamp Exam         Right Left    Lids/Lashes Normal Lid edema, protective ptosis    Conjunctiva/Sclera White and quiet Subconj heme and chemosis inferior 270 degrees    Cornea Clear Diffuse MCE, IOL-K touch    Anterior Chamber Deep and quiet Flat, IOL touching cornea, heme overlying IOL inferior 2/3    Iris Round and reactive Fixed, non-reactive    Lens PCIOL with open PCO     Anterior Vitreous Normal               Fundus Exam         Right Left    Disc Tilted No view    C/D Ratio 0.5     Macula Moderate amount of drusen and RPE changes     Vessels Normal     Periphery Normal, blonde fundus                     Labs/Studies/Imaging Performed  B-scan left eye: AC completley flat with cornea IOL touch, kissing choroidals     Nicky Benitez MD  Resident Physician, PGY-3  Department of Ophthalmology  05/20/24 4:12 PM   Pager: [phone]    Attending Physician Attestation:      I did not examine this patient at this encounter but I discussed the case and the management of this patient's care with the Resident/Fellow. I saw the patient later in the operating room. I edited the note and agree with the assessment and plan as stated above and agree with all of its relevant components.    Harsh Espinoza MD, PhD  , Vitreoretinal Surgery  Department of Ophthalmology  [facility]

## 2024-05-20 NOTE — PROGRESS NOTES
6MS ADMISSION    D: Patient admitted/transferred from [facility] via EMS stretcher for angle-closure glaucoma/L eye vision loss at 1427.    I: Upon arrival to the unit patient was oriented to room, unit, and call light. Patient s height, weight, and vital signs were obtained. Allergies reviewed and allergy band applied. Provider notified of patient s arrival on the unit. Adult AVS completed. Head to toe assessment completed. Education assessment completed. Care plan initiated.    A: Vital signs stable upon admission. Patient rates pain at 0/10. Two RN skin assessment completed with RAH Gaytan with no significant findings.    P: Continue to monitor patient and intervene as needed. Continue with plan of care. Notify provider with any concerns or changes in patient status.

## 2024-05-20 NOTE — ANESTHESIA PREPROCEDURE EVALUATION
Anesthesia Pre-Procedure Evaluation    Patient: Mary Harding   MRN: [id number] : 1945        Procedure : Procedure(s):  Vitrectomy parsplana with 25 gauge system sclerotomy          No past medical history on file.   No past surgical history on file.   Allergies   Allergen Reactions    Mildew     Mold       Social History     Tobacco Use    Smoking status: Not on file    Smokeless tobacco: Not on file   Substance Use Topics    Alcohol use: Not on file      Wt Readings from Last 1 Encounters:   24 56.3 kg (124 lb 1.9 oz)        Anesthesia Evaluation   Pt has had prior anesthetic. Type: General and MAC.    No history of anesthetic complications       ROS/MED HX  ENT/Pulmonary: Comment: # Seasonal allergies.   # Allergies to mildew  - On Allegra 180 mg Daily PRN   #Hx/o spontaneous pneumothorax at age ~28 that required chest tube placement x2. She eventually underwent pleurodesis.      (-) tobacco use   Neurologic:  - neg neurologic ROS  (-) no seizures and no CVA   Cardiovascular: Comment: # Asymptomatic sinus bradycardia, incidental finding during current admission  -- Per patient report, she has a healthy lifestyle and has never been told that she has a slow heart rate. Denies CP, SOB, syncope, lightheadedness.      (+)  - -   -  - -                        dysrhythmias, Other,             METS/Exercise Tolerance: >4 METS Comment:   Lives alone and does her own lawn work, heavy gardening, snow removal, and house chores. Walks 3+ miles daily with her dogs. Denies exertional dyspnea or chest pain.    Hematologic:  - neg hematologic  ROS     Musculoskeletal: Comment: S/p toe surgery - neg musculoskeletal ROS     GI/Hepatic: Comment: S/p cholecystectomy - neg GI/hepatic ROS     Renal/Genitourinary: Comment: S/p tubal ligation.      Endo:       Psychiatric/Substance Use:  - neg psychiatric ROS     Infectious Disease:  - neg infectious disease ROS     Malignancy:  - neg malignancy ROS     Other: Comment:  "  # S/p vitrectomy at [facility] on 5/17/24  -- discharged on 5/18/24. Reports motion sickness and vomiting on the ride back to [place] due to face-down position.   -- Now with loss of vision in left eye: no vision out of left eye and IOP measured at 90  -- Concern for acute angle-closure glaucoma +/- intraocular bleeding   underwent a vitrectomy on 5/17/24. She was Dr. Colvin was then called and told patient to come into ED at [facility].  -- She has received 500 mg of Diamox q8hr. Also got prednisone 40mg, 1 drop of atropine, and 3 doses of Cosopt and brimonidine.           Physical Exam    Airway  airway exam normal      Mallampati: II   TM distance: > 3 FB   Neck ROM: full   Mouth opening: > 3 cm    Respiratory Devices and Support         Dental       (+) Minor Abnormalities - some fillings, tiny chips      Cardiovascular   cardiovascular exam normal          Pulmonary   pulmonary exam normal                OUTSIDE LABS:  CBC: No results found for: \"WBC\", \"HGB\", \"HCT\", \"PLT\"  BMP: No results found for: \"NA\", \"POTASSIUM\", \"CHLORIDE\", \"CO2\", \"BUN\", \"CR\", \"GLC\"  COAGS: No results found for: \"PTT\", \"INR\", \"FIBR\"  POC: No results found for: \"BGM\", \"HCG\", \"HCGS\"  HEPATIC: No results found for: \"ALBUMIN\", \"PROTTOTAL\", \"ALT\", \"AST\", \"GGT\", \"ALKPHOS\", \"BILITOTAL\", \"BILIDIRECT\", \"OZ\"  OTHER: No results found for: \"PH\", \"LACT\", \"A1C\", \"TORI\", \"PHOS\", \"MAG\", \"LIPASE\", \"AMYLASE\", \"TSH\", \"T4\", \"T3\", \"CRP\", \"SED\"    Anesthesia Plan    ASA Status:  3    NPO Status:  NPO Appropriate    Anesthesia Type: General.     - Airway: LMA   Induction: Intravenous.   Maintenance: Balanced.        Consents    Anesthesia Plan(s) and associated risks, benefits, and realistic alternatives discussed. Questions answered and patient/representative(s) expressed understanding.     - Discussed:     - Discussed with:  Patient      - Extended Intubation/Ventilatory Support Discussed: No.      - Patient is DNR/DNI Status: No   "   Use of blood products discussed: No .     Postoperative Care    Pain management: Oral pain medications, IV analgesics.   PONV prophylaxis: Dexamethasone or Solumedrol, Ondansetron (or other 5HT-3)     Comments:               Keisha Huff MD    I have reviewed the pertinent notes and labs in the chart from the past 30 days and (re)examined the patient.  Any updates or changes from those notes are reflected in this note.

## 2024-05-21 ENCOUNTER — OFFICE VISIT (OUTPATIENT)
Dept: OPHTHALMOLOGY | Facility: CLINIC | Age: 79
DRG: 116 | End: 2024-05-21
Attending: OPHTHALMOLOGY
Payer: MEDICARE

## 2024-05-21 DIAGNOSIS — Z48.810 AFTERCARE FOLLOWING SURGERY OF A SENSE ORGAN: Primary | ICD-10-CM

## 2024-05-21 LAB
ANION GAP SERPL CALCULATED.3IONS-SCNC: 14 MMOL/L (ref 7–15)
BASOPHILS # BLD AUTO: 0 10E3/UL (ref 0–0.2)
BASOPHILS NFR BLD AUTO: 0 %
BUN SERPL-MCNC: 13.3 MG/DL (ref 8–23)
CALCIUM SERPL-MCNC: 8 MG/DL (ref 8.8–10.2)
CHLORIDE SERPL-SCNC: 105 MMOL/L (ref 98–107)
CREAT SERPL-MCNC: 0.47 MG/DL (ref 0.51–0.95)
DEPRECATED HCO3 PLAS-SCNC: 16 MMOL/L (ref 22–29)
EGFRCR SERPLBLD CKD-EPI 2021: >90 ML/MIN/1.73M2
EOSINOPHIL # BLD AUTO: 0 10E3/UL (ref 0–0.7)
EOSINOPHIL NFR BLD AUTO: 0 %
ERYTHROCYTE [DISTWIDTH] IN BLOOD BY AUTOMATED COUNT: 13.5 % (ref 10–15)
GLUCOSE SERPL-MCNC: 76 MG/DL (ref 70–99)
HCT VFR BLD AUTO: 38.1 % (ref 35–47)
HGB BLD-MCNC: 12.2 G/DL (ref 11.7–15.7)
IMM GRANULOCYTES # BLD: 0 10E3/UL
IMM GRANULOCYTES NFR BLD: 0 %
LYMPHOCYTES # BLD AUTO: 1.1 10E3/UL (ref 0.8–5.3)
LYMPHOCYTES NFR BLD AUTO: 13 %
MCH RBC QN AUTO: 31.7 PG (ref 26.5–33)
MCHC RBC AUTO-ENTMCNC: 32 G/DL (ref 31.5–36.5)
MCV RBC AUTO: 99 FL (ref 78–100)
MONOCYTES # BLD AUTO: 0.7 10E3/UL (ref 0–1.3)
MONOCYTES NFR BLD AUTO: 8 %
NEUTROPHILS # BLD AUTO: 6.5 10E3/UL (ref 1.6–8.3)
NEUTROPHILS NFR BLD AUTO: 79 %
NRBC # BLD AUTO: 0 10E3/UL
NRBC BLD AUTO-RTO: 0 /100
PLATELET # BLD AUTO: 224 10E3/UL (ref 150–450)
POTASSIUM SERPL-SCNC: 3.9 MMOL/L (ref 3.4–5.3)
RBC # BLD AUTO: 3.85 10E6/UL (ref 3.8–5.2)
SODIUM SERPL-SCNC: 135 MMOL/L (ref 135–145)
TROPONIN T SERPL HS-MCNC: 6 NG/L
WBC # BLD AUTO: 8.2 10E3/UL (ref 4–11)

## 2024-05-21 PROCEDURE — 85025 COMPLETE CBC W/AUTO DIFF WBC: CPT

## 2024-05-21 PROCEDURE — 99024 POSTOP FOLLOW-UP VISIT: CPT | Performed by: OPHTHALMOLOGY

## 2024-05-21 PROCEDURE — 82374 ASSAY BLOOD CARBON DIOXIDE: CPT

## 2024-05-21 PROCEDURE — 250N000009 HC RX 250

## 2024-05-21 PROCEDURE — 93010 ELECTROCARDIOGRAM REPORT: CPT | Performed by: INTERNAL MEDICINE

## 2024-05-21 PROCEDURE — 84484 ASSAY OF TROPONIN QUANT: CPT

## 2024-05-21 PROCEDURE — 258N000003 HC RX IP 258 OP 636: Performed by: STUDENT IN AN ORGANIZED HEALTH CARE EDUCATION/TRAINING PROGRAM

## 2024-05-21 PROCEDURE — 93005 ELECTROCARDIOGRAM TRACING: CPT

## 2024-05-21 PROCEDURE — G0463 HOSPITAL OUTPT CLINIC VISIT: HCPCS | Performed by: OPHTHALMOLOGY

## 2024-05-21 PROCEDURE — 250N000011 HC RX IP 250 OP 636: Mod: JZ

## 2024-05-21 PROCEDURE — 99232 SBSQ HOSP IP/OBS MODERATE 35: CPT | Mod: GC

## 2024-05-21 PROCEDURE — 36415 COLL VENOUS BLD VENIPUNCTURE: CPT

## 2024-05-21 PROCEDURE — 120N000002 HC R&B MED SURG/OB UMMC

## 2024-05-21 RX ORDER — FEXOFENADINE HCL 180 MG/1
180 TABLET ORAL DAILY PRN
COMMUNITY

## 2024-05-21 RX ORDER — DORZOLAMIDE HYDROCHLORIDE AND TIMOLOL MALEATE 20; 5 MG/ML; MG/ML
1 SOLUTION/ DROPS OPHTHALMIC 2 TIMES DAILY
Status: ON HOLD | COMMUNITY
End: 2024-05-22

## 2024-05-21 RX ORDER — PREDNISOLONE ACETATE 10 MG/ML
1-2 SUSPENSION/ DROPS OPHTHALMIC 4 TIMES DAILY
Status: ON HOLD | COMMUNITY
End: 2024-05-22

## 2024-05-21 RX ORDER — SODIUM CHLORIDE, SODIUM LACTATE, POTASSIUM CHLORIDE, CALCIUM CHLORIDE 600; 310; 30; 20 MG/100ML; MG/100ML; MG/100ML; MG/100ML
INJECTION, SOLUTION INTRAVENOUS CONTINUOUS
Status: DISCONTINUED | OUTPATIENT
Start: 2024-05-21 | End: 2024-05-21

## 2024-05-21 RX ORDER — NEOMYCIN SULFATE, POLYMYXIN B SULFATE, AND DEXAMETHASONE 3.5; 10000; 1 MG/G; [USP'U]/G; MG/G
OINTMENT OPHTHALMIC AT BEDTIME
Status: DISCONTINUED | OUTPATIENT
Start: 2024-05-21 | End: 2024-05-22 | Stop reason: HOSPADM

## 2024-05-21 RX ORDER — PREDNISOLONE ACETATE 10 MG/ML
1 SUSPENSION/ DROPS OPHTHALMIC
Status: DISCONTINUED | OUTPATIENT
Start: 2024-05-21 | End: 2024-05-22 | Stop reason: HOSPADM

## 2024-05-21 RX ORDER — ATROPINE SULFATE 10 MG/ML
1 SOLUTION/ DROPS OPHTHALMIC 2 TIMES DAILY
Status: DISCONTINUED | OUTPATIENT
Start: 2024-05-21 | End: 2024-05-22 | Stop reason: HOSPADM

## 2024-05-21 RX ORDER — ZINC GLUCONATE 50 MG
50 TABLET ORAL DAILY
COMMUNITY

## 2024-05-21 RX ORDER — OFLOXACIN 3 MG/ML
1 SOLUTION/ DROPS OPHTHALMIC 4 TIMES DAILY
Status: DISCONTINUED | OUTPATIENT
Start: 2024-05-21 | End: 2024-05-22 | Stop reason: HOSPADM

## 2024-05-21 RX ADMIN — OFLOXACIN 1 DROP: 3 SOLUTION/ DROPS OPHTHALMIC at 16:29

## 2024-05-21 RX ADMIN — PREDNISOLONE ACETATE 1 DROP: 10 SUSPENSION/ DROPS OPHTHALMIC at 18:14

## 2024-05-21 RX ADMIN — ATROPINE SULFATE 1 DROP: 10 SOLUTION/ DROPS OPHTHALMIC at 22:08

## 2024-05-21 RX ADMIN — ACETAZOLAMIDE 500 MG: 500 INJECTION, POWDER, LYOPHILIZED, FOR SOLUTION INTRAVENOUS at 06:30

## 2024-05-21 RX ADMIN — OFLOXACIN 1 DROP: 3 SOLUTION/ DROPS OPHTHALMIC at 19:56

## 2024-05-21 RX ADMIN — SODIUM CHLORIDE, POTASSIUM CHLORIDE, SODIUM LACTATE AND CALCIUM CHLORIDE: 600; 310; 30; 20 INJECTION, SOLUTION INTRAVENOUS at 03:19

## 2024-05-21 RX ADMIN — PREDNISOLONE ACETATE 1 DROP: 10 SUSPENSION/ DROPS OPHTHALMIC at 22:08

## 2024-05-21 RX ADMIN — PREDNISOLONE ACETATE 1 DROP: 10 SUSPENSION/ DROPS OPHTHALMIC at 19:56

## 2024-05-21 RX ADMIN — PREDNISOLONE ACETATE 1 DROP: 10 SUSPENSION/ DROPS OPHTHALMIC at 16:37

## 2024-05-21 ASSESSMENT — ACTIVITIES OF DAILY LIVING (ADL)
ADLS_ACUITY_SCORE: 24
ADLS_ACUITY_SCORE: 23
ADLS_ACUITY_SCORE: 24
ADLS_ACUITY_SCORE: 23
ADLS_ACUITY_SCORE: 20
ADLS_ACUITY_SCORE: 23
ADLS_ACUITY_SCORE: 24
ADLS_ACUITY_SCORE: 23
ADLS_ACUITY_SCORE: 24
DEPENDENT_IADLS:: INDEPENDENT
ADLS_ACUITY_SCORE: 24
ADLS_ACUITY_SCORE: 20
ADLS_ACUITY_SCORE: 20
ADLS_ACUITY_SCORE: 24
ADLS_ACUITY_SCORE: 23
ADLS_ACUITY_SCORE: 23
ADLS_ACUITY_SCORE: 24
ADLS_ACUITY_SCORE: 20

## 2024-05-21 ASSESSMENT — CONF VISUAL FIELD
METHOD: COUNTING FINGERS
OS_INFERIOR_NASAL_RESTRICTION: 1
OS_SUPERIOR_NASAL_RESTRICTION: 1
OS_INFERIOR_TEMPORAL_RESTRICTION: 1
OS_SUPERIOR_TEMPORAL_RESTRICTION: 1

## 2024-05-21 ASSESSMENT — VISUAL ACUITY
OD_SC: 20/60
METHOD: SNELLEN - LINEAR
OD_SC+: -1
OS_SC: NLP

## 2024-05-21 ASSESSMENT — EXTERNAL EXAM - LEFT EYE: OS_EXAM: NORMAL

## 2024-05-21 ASSESSMENT — TONOMETRY
OD_IOP_MMHG: 10
OS_IOP_MMHG: 15
IOP_METHOD: ICARE

## 2024-05-21 NOTE — OR NURSING
"PACU to Inpatient Nursing Handoff    Patient Mary Harding is a 78 year old female who speaks Data Unavailable.   Procedure Procedure(s):  Vitrectomy parsplana with 25 gauge; endolaser, 1000 silikon oil placement, Anterior chamber reformation; air-fluid exchange; Eye Exam with AB Scanner   Surgeon(s) Primary: Harsh Landis MD  Fellow - Assisting: Dg Stone MD     Allergies   Allergen Reactions    Mildew     Mold        Isolation  [unfilled]     Past Medical History   has no past medical history on file.    Anesthesia General   Dermatome Level     Preop Meds acetaminophen (Tylenol) - time given: 1845  Emend 40 mg - time given: 1845   Nerve block Not applicable   Intraop Meds dexamethasone (Decadron)  fentanyl (Sublimaze): 75 mcg total  ondansetron (Zofran): last given at 2155   Local Meds Yes   Antibiotics Not applicable     Pain Patient Currently in Pain: no   PACU meds  Not applicable   PCA / epidural No   Capnography     Telemetry ECG Rhythm: Sinus bradycardia   Inpatient Telemetry Monitor Ordered? No        Labs Glucose Lab Results   Component Value Date     05/20/2024       Hgb Lab Results   Component Value Date    HGB 13.3 05/20/2024       INR No results found for: \"INR\"   PACU Imaging Not applicable     Wound/Incision Incision/Surgical Site 05/20/24 Left Eye (Active)   Incision Assessment UTV 05/20/24 2214   Closure Sutures 05/20/24 2152   Dressing Intervention Other (Comment) 05/20/24 2214   Number of days: 0      CMS        Equipment Not applicable   Other LDA       IV Access Peripheral IV 05/20/24 Anterior;Distal;Right Lower forearm (Active)   Site Assessment WDL 05/20/24 2214   Line Status Infusing 05/20/24 2214   Dressing Transparent 05/20/24 2214   Dressing Status clean;dry;intact 05/20/24 2214   Dressing Intervention New dressing  05/20/24 1725   Line Intervention Flushed 05/20/24 1830   Phlebitis Scale 0-->no symptoms 05/20/24 2214   Infiltration? no 05/20/24 2214 "   Number of days: 0      Blood Products Not applicable EBL 0 mL   Intake/Output Date 05/20/24 0700 - 05/21/24 0659   Shift 2844-7940 9185-1252 9890-6089 24 Hour Total   INTAKE   I.V.  900  900   Shift Total(mL/kg)  900(15.99)  900(15.99)   OUTPUT   Shift Total(mL/kg)       Weight (kg)  56.3 56.3 56.3      Drains / Rivas     Time of void PreOp Time of Void Prior to Procedure: 1814 (05/20/24 1851)    PostOp      Diapered? No   Bladder Scan     PO    water     Vitals    B/P: 101/49  T: 99.3  F (37.4  C)    Temp src: Axillary  P:  Pulse: (!) 41 (05/20/24 1845)          R: 22  O2:  SpO2: 100 %    O2 Device: Simple face mask (oral airway) (05/20/24 2214)    Oxygen Delivery: 8 LPM (05/20/24 2214)         Family/support present None here   Patient belongings     Patient transported on cart   DC meds/scripts (obs/outpt) Not applicable   Inpatient Pain Meds Released? No       Special needs/considerations None   Tasks needing completion None       Spring Sherman, RN

## 2024-05-21 NOTE — OP NOTE
PRE-OP Dx:    1)Ocular hypertension left eye   2) Retinal detachment left eye    Post-OP Dx: same    Attending: JOB Espinoza MD  Fellow: ERICKA Khoury MD    Anesthesia: General with block  Procedure:    1) AC washout   2) Pars plana vitrectomy (PPV) 25g   2) Silicon oil placement     EBL: scant  Specimens: none  Complications: none    Findings:    1) RD left eye     Procedure Description:      Mary Harding is a 78 year old  year old patient with a history of macular hole s/p repair, complicated with ocular hypertension left eye.  The patient was referred with an initial diagnosis of suprachoroidal hemorrhage. After informed consent was obtained, the patient was brought into the OR where general anesthesia was administered.      First, an ultrasound b scan was done that showed no suprachoroidals and showed a gas filled eye obliterating view to vitreous cavity. A 3 ml with a 27 G needle was used to remove 0.8 ml gas from the previously placed gas inside the eye. IOP became normal at this point.     The eye was then prepped and draped in the usual fashion for ophthalmic surgery. Next, a temporal paracentesis was created and a Lewicky cannula was placed in the AC that was flat with hyphema. The AC was deepened and hyphema that was not a lot but occupied the very flat space between the IOL and the cornea was washed. At this point, AC was deepened and remained stable. Attention was then turned to the vitrectomy.  Marks were made on the sclera inferotemporally, superotemporally, and superonasally 3.0 mm posterior to the limbus. The 25g transscleral cannulas were inserted through the sclera using the trocars.  The infusion cannula was connected to the inferotemporal cannula and directly visualized to verify it was in the correct location. It was noted that the retina is detached completely but the nasal retina was very elevated with nasal retina visible behind the lens. A retinotomy was created superonasally in the periphery  and subretinal gas was removed with the soft tip.     Next PFO was instilled into the eye and the retina flattened. A 3 clock hour retina tear with necrotic edges and subretinal blood around it was noted at superotemporal quadrant around equator. A posterior drainage retinotomy was created at nasal 9 o'clock. The eye was filled with PFO. Laser was placed around the tear and previous retinotomy sites, 360 degrees and around the previously treated temporal retinal tear.    Next a fluid air exchange was done with the soft-tip cannula and the light pipe.  A BSS rinse was done to verify all PFO was removed.  An air-oil exchange was done with 1000 cs silicon oil and the cannulas were removed.  The sclerotomies were sutured with 6-0 plain suture as needed and were tight.  The pressure was checked and verified to be appropriate.  A drop of atropine,  and maxitrol ointment were placed in the eye.  A pad and preciado shield were taped over the eye.    The patient left the OR with no complications.    I was present for the entire surgery.  Harsh Espinoza MD

## 2024-05-21 NOTE — CONSULTS
Care Management Initial Consult    General Information  Assessment completed with: Patient,    Type of CM/SW Visit: Initial Assessment    Primary Care Provider verified and updated as needed: Yes   Readmission within the last 30 days: no previous admission in last 30 days      Reason for Consult: discharge planning  Advance Care Planning: Advance Care Planning Reviewed: no concerns identified        Communication Assessment  Patient's communication style: spoken language (English or Bilingual)    Hearing Difficulty or Deaf: no   Wear Glasses or Blind: yes    Cognitive  Cognitive/Neuro/Behavioral: WDL  Level of Consciousness: alert  Arousal Level: opens eyes spontaneously  Orientation: oriented x 4     Best Language: 0 - No aphasia  Speech: spontaneous, clear    Living Environment:   People in home: alone     Current living Arrangements: house      Able to return to prior arrangements: yes     Family/Social Support:  Care provided by: self  Provides care for: pet(s)  Marital Status: Single  Children, Friend          Description of Support System: Supportive, Involved    Support Assessment: Adequate family and caregiver support, Adequate social supports    Current Resources:   Patient receiving home care services: No     Community Resources: None  Equipment currently used at home: none  Supplies currently used at home: None    Employment/Financial:  Employment Status: retired        Financial Concerns: none   Referral to Financial Worker: No     Does the patient's insurance plan have a 3 day qualifying hospital stay waiver?  No    Lifestyle & Psychosocial Needs:  Social Determinants of Health     Food Insecurity: Not on file   Depression: Not at risk (5/4/2024)    Received from [facility]    PHQ-2     PHQ-2 Total : 0   Housing Stability: Not on file   Tobacco Use: Low Risk  (5/19/2024)    Received from [facility] and [facility]    Patient History     Smoking Tobacco Use: Never     Smokeless  Tobacco Use: Never     Passive Exposure: Not on file   Financial Resource Strain: Not on file   Alcohol Use: Not At Risk (5/19/2024)    Received from [facility]    AUDIT-C     Frequency of Alcohol Consumption: Monthly or less     Average Number of Drinks: 1 or 2     Frequency of Binge Drinking: Never   Transportation Needs: Not on file   Physical Activity: Sufficiently Active (8/18/2020)    Received from [facility]    Exercise Vital Sign     Days of Exercise per Week: 7 days     Minutes of Exercise per Session: 60 min   Interpersonal Safety: Not on file   Stress: Not on file   Social Connections: Not on file   Health Literacy: Not on file     Functional Status:  Prior to admission patient needed assistance:   Dependent ADLs:: Independent  Dependent IADLs:: Independent  Assesssment of Functional Status: Not at baseline with ADL Functioning    Mental Health Status:  Mental Health Status: No Current Concerns       Chemical Dependency Status:  Chemical Dependency Status: No Current Concerns           Values/Beliefs:  Spiritual, Cultural Beliefs, Moravian Practices, Values that affect care: no             Additional Information:  Per H&P, patient is a 78 year old female admitted on 5/20/2024. She has no significant past medical history and is admitted for loss of vision in her left eye. Here for urgent evaluation by ophthalmology due to concern for acute angle-closure glaucoma.     Writer completed initial assessment due to no PCP on file. Writer introduced self, role and duties to patient. Patient reports that she resides in an independent home alone with pet brittany rodriguez. She states that she is independent at baseline with ADLs and IADLs. Patient says that her children (1 son in [place] and 2 daughters in [place]) are supportive. She also reports she has supportive friends and neighbors. Patient denies any mental health, chemical dependency or financial concerns.     She is a  retired school psychologist. Patient's primary care physician was added to her chart by writer. She does not have a Healthcare Directive and is not interested in completing at this time. Patient reports that she needs assistance with securing a Hotel room at discharge as she cannot fly until Monday. She states that her daughter is working on flying in to assist her in return home.  and/or RN Care Coordinator to continue to follow for support and discharge planning needs that arise.    BASSAM Barber, MSW  6th Floor Medical Surgical Unit  Phone [phone]

## 2024-05-21 NOTE — PROGRESS NOTES
"[facility]   BRIEF PROGRESS NOTE    SUBJECTIVE  Assessed for post-op check from vitrectomy with air fluid exchange and anterior chamber reformation for ocular hypertension of L eye and retinal detachment of L eye.     Mary is asleep initially and upon waking states she is not in pain but requests water. She states that overall she feels much better than before the procedure. She is drowsy from sleep and anesthesia but is oriented.    She states that she is thirsty as she has not eaten since 5/19 at about 16:30 and has drunk much less fluids than usual since then as well due to being NPO and not feeling well due to her eye pathology.      OBJECTIVE:  /57 (BP Location: Right arm)   Pulse (!) 49   Temp 98.5  F (36.9  C) (Oral)   Resp 17   Ht 1.626 m (5' 4\")   Wt 56.3 kg (124 lb 1.9 oz)   SpO2 98%   BMI 21.30 kg/m      Exam:   General: Alert and oriented, in no acute distress.  Skin: Warm and dry, no abnormalities noted.  Eyes: R eye: Extra-ocular muscles intact. L eye covered with dressing.  ENT: Speech intact, nasal passages open, no hearing impairment noted.  CV: No cyanosis or pallor, warm and well perfused.  Respiratory: No respiratory distress, no accessory muscle use.  Neuro: Comprehension intact. Gross and fine motor skills intact.   Psychiatric: Mood and affect appear normal.   Extremities: Warm, able to move all four extremities at will.      ASSESSMENT/PLAN:    # Ocular hypertension of L eye  # Retinal detachment of L eye  # S/p vitrectomy, air fluid exchange, anterior chamber reformation  Continue symptomatic cares  Tylenol  Zofran, compazine  Consider adding additional pain medications if pain increases on awakening  Continue eye cares as outlined in ophthalmology H&P from today  Start LR at 50 mL/hr for rehydration in context of low po intake over past few days    # History of LBBB  # Sinus bradycardia  Above noted on admission EKG, possible LBBB on this admission EKG. Plan to " reassess with below tests (both ordered) after patient has been out of surgery for a few hours so as to obtain accurate values.  EKG   Troponin     Please see daily rounding note for full A/P.  Clovis Haney MD  2:04 AM

## 2024-05-21 NOTE — PROGRESS NOTES
"  VS: Blood pressure 116/48, pulse (!) 46, temperature 98.1  F (36.7  C), temperature source Oral, resp. rate 16, height 1.626 m (5' 4\"), weight 56.3 kg (124 lb 1.9 oz), SpO2 95%.    O2: RoomAir   Output: Continent of bladder and bowel. Voided x 1 upon return from LEI   Last BM: 5/19/2024 per pt   Activity: Stand by assist    Skin: WDL   Pain: Patient denies pain    CMS: No Edema, denies Numbness and tingling   Dressing: Left eye post op dressing   Diet: Regular   LDA: Right PIV infusing LR @ 50 ml/hr   Plan: Continue Plan of care   Additional Info:        "

## 2024-05-21 NOTE — ANESTHESIA CARE TRANSFER NOTE
Patient: Mary Harding    Procedure: Procedure(s):  Vitrectomy parsplana with 25 gauge; endolaser, 1000 silikon oil placement, Anterior chamber reformation; air-fluid exchange; Eye Exam with AB Scanner       Diagnosis: Suprachoroidal hemorrhage of left eye [H31.302]  Diagnosis Additional Information: No value filed.    Anesthesia Type:   General     Note:    Oropharynx: spontaneously breathing and oral gastic tube in place  Level of Consciousness: drowsy  Oxygen Supplementation: face mask    Independent Airway: airway patency satisfactory and stable  Dentition: dentition unchanged      Patient transferred to: PACU    Handoff Report: Identifed the Patient, Identified the Reponsible Provider, Reviewed the pertinent medical history, Discussed the surgical course, Reviewed Intra-OP anesthesia mangement and issues during anesthesia, Set expectations for post-procedure period and Allowed opportunity for questions and acknowledgement of understanding      Vitals:  Vitals Value Taken Time   /49 05/20/24 2214   Temp 37.4  C (99.3  F) 05/20/24 2214   Pulse 50 05/20/24 2222   Resp 19 05/20/24 2222   SpO2 100 % 05/20/24 2222   Vitals shown include unfiled device data.    Electronically Signed By: ARTEMIO Webster CRNA  May 20, 2024  10:23 PM

## 2024-05-21 NOTE — ANESTHESIA PROCEDURE NOTES
Airway       Patient location during procedure: OR       Procedure Start/Stop Times: 5/20/2024 7:43 PM  Staff -        Anesthesiologist:  Keisha Granados MD       CRNA: Corinna Gilman APRN CRNA       Performed By: CRNA  Consent for Airway        Urgency: elective  Indications and Patient Condition       Indications for airway management: osmel-procedural       Induction type:intravenous       Mask difficulty assessment: 1 - vent by mask    Final Airway Details       Final airway type: supraglottic airway    Supraglottic Airway Details        Type: LMA       Brand: Air-Q       LMA size: 3    Post intubation assessment        Placement verified by: capnometry, equal breath sounds and chest rise        Number of attempts at approach: 1       Secured with: tape       Ease of procedure: easy       Dentition: Unchanged and Intact    Medication(s) Administered   Medication Administration Time: 5/20/2024 7:43 PM

## 2024-05-21 NOTE — UTILIZATION REVIEW
Admission Status; Secondary Review Determination       Under the authority of the Utilization Management Committee, the utilization review process indicated a secondary review on the above patient. The review outcome is based on review of the medical records, discussions with staff, and applying clinical experience noted on the date of the review.     (x) Inpatient Status Appropriate - This patient's medical care is consistent with medical management for inpatient care and reasonable inpatient medical practice.     RATIONALE FOR DETERMINATION   77 yo female who was urgently transferred from [facility] in [place], [place] to the [facility] for ophthalmology consultation and intervention. Patient had left vitrectomy surgery in [place] to left retina on Friday 5/17/24. Was able to go home Saturday 5/18/24, patient became car sick and had multiple episodes of vomiting.     On 5/20/24 she presented to the outside hospital ED with loss of vision in  left eye. Transfer requested for treatment of acute suprachoroidal hemorrhage of left eye. On 5/20/24 underwent the following surgery: Vitrectomy parsplana with 25 gauge; endolaser, 1000 silikon oil placement, Anterior chamber reformation; air-fluid exchange; Eye Exam with AB Scanner, Left - Eye. Although this may be classified as an outpatient surgery, the fact that it was urgent and required outs of state transfer for the high level of expertise needed, inpatient status is appropriate due to higher level of care and specialty ophthalmologic expertise needed. Not discharging home today 5/21/24.      At the time of admission with the information available to the attending physician more than 2 nights hospital complex care was anticipated, based on patient risk of adverse outcome if treated as outpatient and complex care required. Inpatient admission is appropriate based on the Medicare guidelines.     This document was produced using voice recognition  software.    The information on this document is developed by the utilization review team in order for the business office to ensure compliance. This only denotes the appropriateness of proper admission status and does not reflect the quality of care rendered.   The definitions of Inpatient Status and Observation Status used in making the determination above are those provided in the CMS Coverage Manual, Chapter 1 and Chapter 6, section 70.4.     Sincerely,   Thania Moreau MD  Utilization Review  Physician Advisor  [facility].

## 2024-05-21 NOTE — ANESTHESIA POSTPROCEDURE EVALUATION
Patient: Mary Harding    Procedure: Procedure(s):  Vitrectomy parsplana with 25 gauge; endolaser, 1000 silikon oil placement, Anterior chamber reformation; air-fluid exchange; Eye Exam with AB Scanner       Anesthesia Type:  General    Note:  Disposition: Inpatient   Postop Pain Control: Uneventful            Sign Out: Well controlled pain   PONV: No   Neuro/Psych: Uneventful            Sign Out: Acceptable/Baseline neuro status   Airway/Respiratory: Uneventful            Sign Out: Acceptable/Baseline resp. status   CV/Hemodynamics: Uneventful            Sign Out: Acceptable CV status; No obvious hypovolemia; No obvious fluid overload   Other NRE:    DID A NON-ROUTINE EVENT OCCUR?            Last vitals:  Vitals Value Taken Time   /66 05/20/24 2300   Temp 37.3  C (99.1  F) 05/20/24 2300   Pulse 55 05/20/24 2308   Resp 0 05/20/24 2309   SpO2 98 % 05/20/24 2311   Vitals shown include unfiled device data.    Electronically Signed By: Michael Isaac MD  May 20, 2024  11:42 PM

## 2024-05-21 NOTE — BRIEF OP NOTE
[facility]    Brief Operative Note    Pre-operative diagnosis: Suprachoroidal hemorrhage of left eye [H31.302]  Post-operative diagnosis Same as pre-operative diagnosis    Procedure: Vitrectomy parsplana with 25 gauge; endolaser, 1000 silikon oil placement, Anterior chamber reformation; air-fluid exchange; Eye Exam with AB Scanner, Left - Eye    Surgeon: Surgeons and Role:     * Harsh Landis MD - Primary     * Dg Stone MD - Fellow - Assisting  Anesthesia: General   Estimated Blood Loss: None    Drains: None  Specimens: * No specimens in log *  Findings:   None.  Complications: None.  Implants:   Implant Name Type Inv. Item Serial No.  Lot No. LRB No. Used Action   EYE SILIKON OIL 1000 8.5ML [id number] - [id number] Lens/Eye Implant EYE SILIKON OIL 1000 8.5ML [id number]  [id number] Left 1 Implanted   EYE [employer]IL[employer] OIL 1000 8.5ML [id number] - [id number] Lens/Eye Implant EYE SILIKON OIL 1000 8.5ML [id number]  [employer] Left 1 Implanted

## 2024-05-21 NOTE — PLAN OF CARE
"[id number]    Plan of Care Reviewed With: patient    Overall Patient Progress: no change    Outcome Evaluation: Pt is A & O x4 on RA. Denies pain, nausea, chest pain & SOB. Removed IV per pt request. Pt has L eye surigcal site - went for ophthalmology appt early in shift and has protective shield in place. Pt is SBA voids spontaneously, and uses call light appropriately.     Shift Updates  Pt left unit at 0920 via EMS wheelchair for eye clinic appointment. Pt taken off cardiac monitoring and continuous fluids - approved by MD prior to. Pt arrived back onto unit at BLANK 1220.   Cardiac monitoring and continuous IVF orders discontinued.    Vitals: /49 (BP Location: Right arm)   Pulse (!) 46   Temp 98.8  F (37.1  C) (Oral)   Resp 16   Ht 1.626 m (5' 4\")   Wt 56.3 kg (124 lb 1.9 oz)   SpO2 96%   BMI 21.30 kg/m      Plan: TBD. Continue with plan of care.      "

## 2024-05-21 NOTE — PLAN OF CARE
"  Problem: Adult Inpatient Plan of Care  Goal: Plan of Care Review  Description: The Plan of Care Review/Shift note should be completed every shift.  The Outcome Evaluation is a brief statement about your assessment that the patient is improving, declining, or no change.  This information will be displayed automatically on your shift  note.  Outcome: Progressing  Flowsheets (Taken 5/21/2024 1341)  Outcome Evaluation: Patient to stay in Hotel until able to fly back home.  Plan of Care Reviewed With: patient  Overall Patient Progress: no change     Problem: Adult Inpatient Plan of Care  Goal: Plan of Care Review  Description: The Plan of Care Review/Shift note should be completed every shift.  The Outcome Evaluation is a brief statement about your assessment that the patient is improving, declining, or no change.  This information will be displayed automatically on your shift  note.  Outcome: Progressing  Flowsheets (Taken 5/21/2024 1341)  Outcome Evaluation: Patient to stay in Hotel until able to fly back home.  Plan of Care Reviewed With: patient  Overall Patient Progress: no change  Goal: Patient-Specific Goal (Individualized)  Description: You can add care plan individualizations to a care plan. Examples of Individualization might be:  \"Parent requests to be called daily at 9am for status\", \"I have a hard time hearing out of my right ear\", or \"Do not touch me to wake me up as it startles  me\".  Outcome: Progressing  Goal: Absence of Hospital-Acquired Illness or Injury  Outcome: Progressing  Goal: Optimal Comfort and Wellbeing  Outcome: Progressing  Goal: Readiness for Transition of Care  Outcome: Progressing     Problem: Risk for Delirium  Goal: Optimal Coping  Outcome: Progressing  Goal: Improved Behavioral Control  Outcome: Progressing  Goal: Improved Attention and Thought Clarity  Outcome: Progressing  Goal: Improved Sleep  Outcome: Progressing     "

## 2024-05-21 NOTE — PROGRESS NOTES
Postoperative day 1 status post PPV/AC reformation/SO for gas overfill and RRD     Slept well  Retina attached  Doing well    Plan:  Position: face down  No heavy lifting   Shield at night  Retina detachment and endophthalmitis precautions were discussed with the patient and was asked to return if any of the those occur  Recommended to stay in town for another 3-4 days before being seen again  I checked and [facility] is NLP unfortunately; I know that retina is attached at this point; AC is deep but there is a significant amount of inflammation in AC that will improve with drops in the next few days but this should not cause an NLP vision; so I think we have had some damages to optic nerve; I am hoping that she recovers some vision in the next few days.     Medications to operative eye    Prednisolone (white or pink top) every 2 hours right eye   Ofloxacin (tan top) 4/day right eye   Atropine (red top) 2/day right eye   Maxitrol ointment at night     RTC 3 days with Dr. Khoury or Benedict Garcia       Complete documentation of historical and exam elements from today's encounter can be found in the full encounter summary report (not reduplicated in this progress note). I personally obtained the chief complaint(s) and history of present illness.  I confirmed and edited as necessary the review of systems, past medical/surgical history, family history, social history, and examination findings as documented by others; and I examined the patient myself. I personally reviewed the relevant tests, images, and reports as documented above. I formulated and edited as necessary the assessment and plan and discussed the findings and management plan with the patient and family.    Harsh Espinoza MD  , Vitreoretinal surgery   Department of Ophthalmology  [facility]

## 2024-05-21 NOTE — PROGRESS NOTES
[facility]    Progress Note - [facility] Family Medicine Service       Date of Admission:  5/20/2024    Updates from today:  - Discontinue Diamox 500 mg IV q12hr  - Discontinue Cosopt 1 drop BID in left eye  - Discontinue Brimonidine 1 drop TID in left eye  - Discontinue latanoprost 1 drop at bedtime  - Start Prednisolone (white or pink top) every 2 hours left eye   - Start Ofloxacin (tan top) QID left eye   - Start Atropine (red top) BID left eye   - Start Maxitrol ointment at night   - Discontinue LR infusion since tolerating PO    Assessment & Plan    Mary Harding is a 78 year old female admitted on 5/20/2024. She has no significant past medical history and is admitted for loss of vision in her left eye. Underwent repeat vitrectomy for suprachoroidal hemorrhage of left eye.      # S/p vitrectomy on 5/17/24  # Sudden-onset loss of vision in left eye  # Suprachoroidal hemorrhage of left eye  There is no access to outside records; therefore, chart review is limited. Loss of vision on 5/18/24 after vitrectomy on 5/17/24. Urgently underwent a vitrectomy parsplana, anterior chamber reformation, and air-fluid exchange. Mary was doing much better today and is recovering from the operation well.   Workup:  - Daily CBC, BMP  Management:  - Discontinue Diamox 500 mg IV q12hr  - Discontinue Cosopt 1 drop BID in left eye  - Discontinue Brimonidine 1 drop TID in left eye  - Discontinue latanoprost 1 drop at bedtime  - Start Prednisolone (white or pink top) every 2 hours left eye   - Start Ofloxacin (tan top) QID left eye   - Start Atropine (red top) BID left eye   - Start Maxitrol ointment at night   - S/p prednisone 40mg  - S/p 1 drop of atropine  - Fall precautions  Consult:  - Ophthalmology consult; appreciate recommendations     # Asymptomatic sinus bradycardia  # History of LBBB  Mary reports that she has a healthy lifestyle and has never been told that she has a slow  "heart rate. Denies CP, SOB, syncope, lightheadedness. Troponin WNL. EKG revealed sinus bradycardia.     # Allergies to mildew  - PTA Allegra 180 mg Daily PRN (She reports that she takes the \"standard dose\" of Allegra, although she does not exactly remember what it is)        Diet: Regular Diet Adult    DVT Prophylaxis: Low Risk/Ambulatory with no VTE prophylaxis indicated  Rivas Catheter: Not present  Fluids: PO  Lines: None     Cardiac Monitoring: None  Code Status:  CPR- Do NOT Intubate    Clinically Significant Risk Factors Present on Admission          # Hypocalcemia: Lowest Ca = 8 mg/dL in last 2 days, will monitor and replace as appropriate                         Disposition Plan     Expected Discharge Date: 05/22/2024                The patient's care was discussed with the Attending Physician, Dr. Caleb DO .    Mireya Vizcarra DO, BARAK  [facility]  [facility]  Securely message with SetMe[employer] (more info)  Text page via [facility] Paging/Directory   See signed in provider for up to date coverage information  ______________________________________________________________________    Interval History   Overnight: Postoperative check was within normal limits. EKG and troponin ordered to assess bradycardia and rule out ischemia.    Subjective: Mary reports that she is feeling much better. She was able to eat breakfast without issue. She was preparing to go to the ophthalmology appointment at the [facility] for follow up. Denies eye pain, headache, chest pain, and shortness of breath.     Physical Exam   Vital Signs: Temp: 98.8  F (37.1  C) Temp src: Oral BP: 129/49 Pulse: (!) 46   Resp: 16 SpO2: 96 % O2 Device: None (Room air) Oxygen Delivery: 6 LPM  Weight: 124 lbs 1.9 oz    Physical Exam  Vitals reviewed.   Constitutional:       Appearance: Normal appearance.   HENT:      Head: Normocephalic and atraumatic.   Eyes:      General:         Right eye: No " discharge.      Comments: Left eye bandaged.   Cardiovascular:      Rate and Rhythm: Regular rhythm. Bradycardia present.      Heart sounds: Normal heart sounds.   Pulmonary:      Effort: Pulmonary effort is normal.      Breath sounds: Normal breath sounds.   Musculoskeletal:         General: Normal range of motion.      Cervical back: Normal range of motion and neck supple.      Right lower leg: No edema.      Left lower leg: No edema.   Skin:     General: Skin is warm and dry.   Neurological:      General: No focal deficit present.      Mental Status: She is alert and oriented to person, place, and time.   Psychiatric:         Mood and Affect: Mood normal.         Behavior: Behavior normal.         Thought Content: Thought content normal.         Judgment: Judgment normal.       Medical Decision Making   Please see A&P for additional details of medical decision making.      Data     I have personally reviewed the following data over the past 24 hrs:    8.2  \   12.2   / 224     135 105 13.3 /  76   3.9 16 (L) 0.47 (L) \     ALT: 19 AST: 23 AP: 69 TBILI: 0.4   ALB: 3.8 TOT PROTEIN: 6.4 LIPASE: N/A     Trop: 6 BNP: N/A       Imaging results reviewed over the past 24 hrs:   No results found for this or any previous visit (from the past 24 hour(s)).

## 2024-05-21 NOTE — PHARMACY-ADMISSION MEDICATION HISTORY
Pharmacist Admission Medication History    Admission medication history is complete. The information provided in this note is only as accurate as the sources available at the time of the update.    Information Source(s): Patient and CareEverywhere/SureScripts via in-person    Pertinent Information:   Pt never started dorzolamide/timolol eye drops as she hadn't picked this up from the pharmacy yet    Allergies reviewed with patient and updates made in EHR: yes    Medication History Completed By: Jenniffer Bob [facility] 5/21/2024 2:27 PM    PTA Med List   Medication Sig Last Dose    calcium-vitamin D-vitamin K (VIACTIV) 500-500-40 MG-UNT-MCG CHEW Take 1 tablet by mouth daily Past Week    dorzolamide-timolol (COSOPT) 2-0.5 % ophthalmic solution Place 1 drop Into the left eye 2 times daily not yet started, never picked up prescription    fexofenadine (ALLEGRA) 180 MG tablet Take 180 mg by mouth daily as needed for allergies     prednisoLONE acetate (PRED FORTE) 1 % ophthalmic suspension Place 1-2 drops Into the left eye 4 times daily Past Week    zinc gluconate 50 MG tablet Take 50 mg by mouth daily Past Week

## 2024-05-22 VITALS
RESPIRATION RATE: 16 BRPM | OXYGEN SATURATION: 97 % | SYSTOLIC BLOOD PRESSURE: 134 MMHG | HEIGHT: 64 IN | DIASTOLIC BLOOD PRESSURE: 52 MMHG | BODY MASS INDEX: 21.19 KG/M2 | WEIGHT: 124.12 LBS | HEART RATE: 56 BPM | TEMPERATURE: 97.4 F

## 2024-05-22 PROBLEM — H40.20X0 ANGLE-CLOSURE GLAUCOMA: Status: RESOLVED | Noted: 2024-05-20 | Resolved: 2024-05-22

## 2024-05-22 PROBLEM — H31.302: Status: ACTIVE | Noted: 2024-05-22

## 2024-05-22 LAB
ANION GAP SERPL CALCULATED.3IONS-SCNC: 13 MMOL/L (ref 7–15)
ATRIAL RATE - MUSE: 41 BPM
ATRIAL RATE - MUSE: 43 BPM
BASOPHILS # BLD AUTO: 0 10E3/UL (ref 0–0.2)
BASOPHILS NFR BLD AUTO: 1 %
BUN SERPL-MCNC: 28.1 MG/DL (ref 8–23)
CALCIUM SERPL-MCNC: 9.2 MG/DL (ref 8.8–10.2)
CHLORIDE SERPL-SCNC: 105 MMOL/L (ref 98–107)
CREAT SERPL-MCNC: 0.74 MG/DL (ref 0.51–0.95)
DEPRECATED HCO3 PLAS-SCNC: 20 MMOL/L (ref 22–29)
DIASTOLIC BLOOD PRESSURE - MUSE: NORMAL MMHG
DIASTOLIC BLOOD PRESSURE - MUSE: NORMAL MMHG
EGFRCR SERPLBLD CKD-EPI 2021: 81 ML/MIN/1.73M2
EOSINOPHIL # BLD AUTO: 0 10E3/UL (ref 0–0.7)
EOSINOPHIL NFR BLD AUTO: 1 %
ERYTHROCYTE [DISTWIDTH] IN BLOOD BY AUTOMATED COUNT: 13.5 % (ref 10–15)
GLUCOSE SERPL-MCNC: 93 MG/DL (ref 70–99)
HCT VFR BLD AUTO: 43.6 % (ref 35–47)
HGB BLD-MCNC: 14.5 G/DL (ref 11.7–15.7)
IMM GRANULOCYTES # BLD: 0.1 10E3/UL
IMM GRANULOCYTES NFR BLD: 1 %
INTERPRETATION ECG - MUSE: NORMAL
INTERPRETATION ECG - MUSE: NORMAL
LYMPHOCYTES # BLD AUTO: 1.3 10E3/UL (ref 0.8–5.3)
LYMPHOCYTES NFR BLD AUTO: 17 %
MCH RBC QN AUTO: 31.3 PG (ref 26.5–33)
MCHC RBC AUTO-ENTMCNC: 33.3 G/DL (ref 31.5–36.5)
MCV RBC AUTO: 94 FL (ref 78–100)
MONOCYTES # BLD AUTO: 0.8 10E3/UL (ref 0–1.3)
MONOCYTES NFR BLD AUTO: 10 %
NEUTROPHILS # BLD AUTO: 5.6 10E3/UL (ref 1.6–8.3)
NEUTROPHILS NFR BLD AUTO: 70 %
NRBC # BLD AUTO: 0 10E3/UL
NRBC BLD AUTO-RTO: 0 /100
P AXIS - MUSE: 2 DEGREES
P AXIS - MUSE: 9 DEGREES
PLATELET # BLD AUTO: 268 10E3/UL (ref 150–450)
POTASSIUM SERPL-SCNC: 4 MMOL/L (ref 3.4–5.3)
PR INTERVAL - MUSE: 132 MS
PR INTERVAL - MUSE: 142 MS
QRS DURATION - MUSE: 120 MS
QRS DURATION - MUSE: 134 MS
QT - MUSE: 482 MS
QT - MUSE: 490 MS
QTC - MUSE: 404 MS
QTC - MUSE: 407 MS
R AXIS - MUSE: 58 DEGREES
R AXIS - MUSE: 68 DEGREES
RBC # BLD AUTO: 4.63 10E6/UL (ref 3.8–5.2)
SODIUM SERPL-SCNC: 138 MMOL/L (ref 135–145)
SYSTOLIC BLOOD PRESSURE - MUSE: NORMAL MMHG
SYSTOLIC BLOOD PRESSURE - MUSE: NORMAL MMHG
T AXIS - MUSE: 18 DEGREES
T AXIS - MUSE: 3 DEGREES
T4 FREE SERPL-MCNC: 1.57 NG/DL (ref 0.9–1.7)
TSH SERPL DL<=0.005 MIU/L-ACNC: 5.95 UIU/ML (ref 0.3–4.2)
VENTRICULAR RATE- MUSE: 41 BPM
VENTRICULAR RATE- MUSE: 43 BPM
WBC # BLD AUTO: 7.8 10E3/UL (ref 4–11)

## 2024-05-22 PROCEDURE — 93005 ELECTROCARDIOGRAM TRACING: CPT

## 2024-05-22 PROCEDURE — 99239 HOSP IP/OBS DSCHRG MGMT >30: CPT | Mod: GC

## 2024-05-22 PROCEDURE — 84439 ASSAY OF FREE THYROXINE: CPT

## 2024-05-22 PROCEDURE — 82374 ASSAY BLOOD CARBON DIOXIDE: CPT

## 2024-05-22 PROCEDURE — 250N000009 HC RX 250

## 2024-05-22 PROCEDURE — 85004 AUTOMATED DIFF WBC COUNT: CPT

## 2024-05-22 PROCEDURE — 93010 ELECTROCARDIOGRAM REPORT: CPT | Performed by: INTERNAL MEDICINE

## 2024-05-22 PROCEDURE — 84443 ASSAY THYROID STIM HORMONE: CPT

## 2024-05-22 PROCEDURE — 82565 ASSAY OF CREATININE: CPT

## 2024-05-22 PROCEDURE — 36415 COLL VENOUS BLD VENIPUNCTURE: CPT

## 2024-05-22 RX ORDER — ATROPINE SULFATE 10 MG/ML
1 SOLUTION/ DROPS OPHTHALMIC 2 TIMES DAILY
Qty: 15 ML | Refills: 0 | Status: SHIPPED | OUTPATIENT
Start: 2024-05-22

## 2024-05-22 RX ORDER — OFLOXACIN 3 MG/ML
1 SOLUTION/ DROPS OPHTHALMIC 4 TIMES DAILY
Qty: 10 ML | Refills: 0 | Status: SHIPPED | OUTPATIENT
Start: 2024-05-22

## 2024-05-22 RX ORDER — PREDNISOLONE ACETATE 10 MG/ML
1-2 SUSPENSION/ DROPS OPHTHALMIC
Qty: 15 ML | Refills: 0 | Status: SHIPPED | OUTPATIENT
Start: 2024-05-22

## 2024-05-22 RX ORDER — NEOMYCIN SULFATE, POLYMYXIN B SULFATE, AND DEXAMETHASONE 3.5; 10000; 1 MG/G; [USP'U]/G; MG/G
0.5 OINTMENT OPHTHALMIC AT BEDTIME
Qty: 3.5 G | Refills: 0 | Status: SHIPPED | OUTPATIENT
Start: 2024-05-22

## 2024-05-22 RX ADMIN — PREDNISOLONE ACETATE 1 DROP: 10 SUSPENSION/ DROPS OPHTHALMIC at 00:02

## 2024-05-22 RX ADMIN — PREDNISOLONE ACETATE 1 DROP: 10 SUSPENSION/ DROPS OPHTHALMIC at 15:17

## 2024-05-22 RX ADMIN — ATROPINE SULFATE 1 DROP: 10 SOLUTION/ DROPS OPHTHALMIC at 07:40

## 2024-05-22 RX ADMIN — PREDNISOLONE ACETATE 1 DROP: 10 SUSPENSION/ DROPS OPHTHALMIC at 02:03

## 2024-05-22 RX ADMIN — NEOMYCIN AND POLYMYXIN B SULFATES AND DEXAMETHASONE: 3.5; 10000; 1 OINTMENT OPHTHALMIC at 00:02

## 2024-05-22 RX ADMIN — OFLOXACIN 1 DROP: 3 SOLUTION/ DROPS OPHTHALMIC at 11:48

## 2024-05-22 RX ADMIN — OFLOXACIN 1 DROP: 3 SOLUTION/ DROPS OPHTHALMIC at 15:16

## 2024-05-22 RX ADMIN — PREDNISOLONE ACETATE 1 DROP: 10 SUSPENSION/ DROPS OPHTHALMIC at 11:48

## 2024-05-22 RX ADMIN — PREDNISOLONE ACETATE 1 DROP: 10 SUSPENSION/ DROPS OPHTHALMIC at 10:03

## 2024-05-22 RX ADMIN — OFLOXACIN 1 DROP: 3 SOLUTION/ DROPS OPHTHALMIC at 07:39

## 2024-05-22 RX ADMIN — PREDNISOLONE ACETATE 1 DROP: 10 SUSPENSION/ DROPS OPHTHALMIC at 13:43

## 2024-05-22 RX ADMIN — PREDNISOLONE ACETATE 1 DROP: 10 SUSPENSION/ DROPS OPHTHALMIC at 06:19

## 2024-05-22 RX ADMIN — PREDNISOLONE ACETATE 1 DROP: 10 SUSPENSION/ DROPS OPHTHALMIC at 07:41

## 2024-05-22 ASSESSMENT — ACTIVITIES OF DAILY LIVING (ADL)
ADLS_ACUITY_SCORE: 26

## 2024-05-22 NOTE — PLAN OF CARE
Problem: Adult Inpatient Plan of Care  Goal: Absence of Hospital-Acquired Illness or Injury  Intervention: Prevent Skin Injury  Recent Flowsheet Documentation  Taken 5/22/2024 0043 by Wilfredo Bach RN  Body Position:   semi-prone   side-lying   left     Problem: Adult Inpatient Plan of Care  Goal: Absence of Hospital-Acquired Illness or Injury  Intervention: Identify and Manage Fall Risk  Recent Flowsheet Documentation  Taken 5/22/2024 0043 by Wilfredo Bach RN  Safety Promotion/Fall Prevention:   activity supervised   assistive device/personal items within reach   clutter free environment maintained   increased rounding and observation   increase visualization of patient   nonskid shoes/slippers when out of bed   patient and family education   room near nurse's station   room organization consistent   safety round/check completed     Problem: Adult Inpatient Plan of Care  Goal: Absence of Hospital-Acquired Illness or Injury  Intervention: Prevent Infection  Recent Flowsheet Documentation  Taken 5/22/2024 0043 by Wilfredo Bach RN  Infection Prevention:   personal protective equipment utilized   rest/sleep promoted   single patient room provided   Goal Outcome Evaluation:      Plan of Care Reviewed With: patient    Overall Patient Progress: improvingOverall Patient Progress: improving    Outcome Evaluation: pt alert and oriented x4, denies SOb, chest pain and Nausea/vomiting. pt denies pain, LBM 5/21/23. Left eye draining thin, tan drainage. eye shied in place. no acute concerns.

## 2024-05-22 NOTE — PLAN OF CARE
Patient is alert and ox4, VSS, on RA.    Denied chest pain, SOB, N/V, incisional pain.     Left eye covered with eye shield.     IND.     Continent of bladder and bowel movement, LBM 5/20 before OR.   Passing gas.     Good appetite on regular diet.     Skin intact, no PIV access.     Discharge medication reviewed with the patient and relabeled with bigger letters which patient can read.     SW set up the hotel for the patient after discharge.

## 2024-05-22 NOTE — PROGRESS NOTES
Care Management Discharge Note    Discharge Date: 05/22/2024     Discharge Disposition: Hotel, then home    Discharge Services: Transportation Services    Discharge DME: None    Discharge Transportation: Patient to take Uber    Private pay costs discussed: private room/amenity fees, Transportation Costs    Does the patient's insurance plan have a 3 day qualifying hospital stay waiver?  No    PAS Confirmation Code: N/A     Patient/family educated on Medicare website which has current facility and service quality ratings: N/A    Education Provided on the Discharge Plan: Yes    Persons Notified of Discharge Plans: Charge Nurse, Bedside Nurse, MD, Patient, Patient's Daughter    Patient/Family in Agreement with the Plan: Yes    Handoff Referral Completed: Yes    Additional Information:    Patient to discharge to [facility] in [place]. Writer assisted patient in calling to secure lodging, with Hospital rate. Writer confirmed lodging is set up, 5/22-5/26. Patient's daughter is flying in on Friday. Patient will order uber around 4 to Hot. Hotel confirmed they have shuttle to Hospital and writer has the number to call. IMM completed, documented in Epic, faxed to HIM and original in chart. Hand off to patient's primary care physician completed.     BASSAM Barber, MSW  6th Floor Medical Surgical Unit  Phone [phone]

## 2024-05-22 NOTE — DISCHARGE SUMMARY
[facility]  Discharge Summary - Medicine & Pediatrics       Date of Admission:  5/20/2024  Date of Discharge:  5/22/2024  Discharging Provider: Dr. Caleb DO  Discharge Service: [facility] Medicine Service    Discharge Diagnoses   # S/p vitrectomy on 5/17/24  # Sudden-onset loss of vision in left eye  # Suprachoroidal hemorrhage of left eye  # S/p repeat vitrectomy on 5/20/24  # Asymptomatic sinus bradycardia  # History of LBBB    Clinically Significant Risk Factors          Follow-ups Needed After Discharge   Follow-up Appointments     Adult [facility] Follow-up and recommended labs and tests      Follow up with primary care provider, Griffin Schwab, within 7 days of   returning to [place] for hospital follow- up.     You also have an appointment at the eye clinic on May 24th, 2024 at 9:00   AM. Address: [address] [place] [address] 9[facility], [place] [address]    Appointments on [place] and/or [facility] (with [facility] or [facility]   provider or service). Call [phone] if you haven't heard regarding   these appointments within 7 days of discharge.          Discharge Disposition   Discharged to [facility] while she waits for her appointment with the eye clinic on 5/24/2024.  Condition at discharge: Stable    Hospital Course    Mary Harding is a 78 year old female admitted on 5/20/2024. She has no significant past medical history and is admitted for loss of vision in her left eye. Underwent repeat vitrectomy for suprachoroidal hemorrhage of left eye.      # S/p vitrectomy on 5/17/24  # Sudden-onset loss of vision in left eye  # Suprachoroidal hemorrhage of left eye  # S/p repeat vitrectomy on 5/20/24  Loss of vision on 5/18/24 after vitrectomy on 5/17/24 due to vomiting on the way home from the procedure from motion sickness in the car. Physical examination on admission revealed hemorrhage of the left sclera with poor pupillary reaction to light and no lateral  movement of her left eye. Reports that she cannot see anything. STAT ophthalmology consult placed, and they performed a repeat a vitrectomy parsplana and anterior chamber reformation soon after admission on 5/20/24. She was seen at the eye clinic on 5/21 for follow up, during which she had no light perception in her left eye. On discharge, Mary still could not see light in her left eye. She reports that all she sees in black. But overall, she was doing much better regarding her eye symptoms and is recovering from the operation well. She will be discharging to a hotel in [facility] because she lives in [place] and has another appointment with ophthalmology in [place] on 5/24/2024.   Management:  - Prednisolone (white or pink top) every 2 hours left eye   - Ofloxacin (tan top) QID left eye   - Atropine (red top) BID left eye   - Start Maxitrol ointment at night      # Asymptomatic sinus bradycardia  # History of LBBB  # Subclinical hypothyroidism  Mary reports that she has a healthy lifestyle and has never been told that she has a slow heart rate. Denies CP, SOB, syncope, lightheadedness. Troponin within normal limit. TSH slightly elevated at 5.95 with normal T4 at 1.57. Unlikely contributing to bradycardia. Recommend outpatient follow up.     Consultations This Hospital Stay   OPHTHALMOLOGY IP CONSULT  OPHTHALMOLOGY IP CONSULT  CARE MANAGEMENT / SOCIAL WORK IP CONSULT    Code Status   Special Code     The patient was discussed with Dr. Caleb DO.    Mireya Vizcarra DO, MPH  [facility]  [facility] MED SURG  [facility] [address]  Phone: [phone]  Fax: [phone]  ______________________________________________________________________    Physical Exam   Vital Signs: Temp: 97.4  F (36.3  C) Temp src: Oral BP: 134/52 Pulse: 56   Resp: 16 SpO2: 97 % O2 Device: None (Room air)    Weight: 124 lbs 1.9 oz    Physical Exam  Vitals reviewed.    Constitutional:       Appearance: Normal appearance.   HENT:      Head: Normocephalic and atraumatic.   Eyes:      General: Lids are normal. Visual field deficit (cannot see light in left eye) present.      Conjunctiva/sclera:      Left eye: Left conjunctiva is injected.      Comments: Left cornea appears hazy.   Pulmonary:      Effort: Pulmonary effort is normal.   Musculoskeletal:         General: Normal range of motion.      Cervical back: Normal range of motion and neck supple.   Skin:     General: Skin is warm and dry.   Neurological:      General: No focal deficit present.      Mental Status: She is alert and oriented to person, place, and time.   Psychiatric:         Mood and Affect: Mood normal.         Behavior: Behavior normal.         Thought Content: Thought content normal.         Judgment: Judgment normal.       Primary Care Physician   Griffin Schwab    Discharge Orders      Activity    Your activity upon discharge: activity as tolerated     Reason for your hospital stay    You were admitted to the hospital for bleeding in your left eye. You underwent a surgery, and now you are safe to discharge.     Adult [facility] Follow-up and recommended labs and tests    Follow up with primary care provider, Griffin Schwab, within 7 days of returning to [place] for hospital follow- up.     You also have an appointment at the eye clinic on May 24th, 2024 at 9:00 AM. Address: [address] [place] [address] 9[facility] hospital, [place] [address]    Appointments on [place] and/or [facility] (with [facility] or [facility] provider or service). Call [phone] if you haven't heard regarding these appointments within 7 days of discharge.     Diet    Follow this diet upon discharge: Regular       Significant Results and Procedures   Most Recent 3 CBC's:  Recent Labs   Lab Test 05/22/24  0756 05/21/24  0840 05/20/24  1818   WBC 7.8 8.2 9.0   HGB 14.5 12.2 13.3   MCV 94 99 96    224 262     Most Recent 3 BMP's:  Recent Labs   Lab  Test 05/22/24  0756 05/21/24  0840 05/20/24  1818    135 137   POTASSIUM 4.0 3.9 4.0   CHLORIDE 105 105 107   CO2 20* 16* 20*   BUN 28.1* 13.3 16.4   CR 0.74 0.47* 0.63   ANIONGAP 13 14 10   TORI 9.2 8.0* 9.3   GLC 93 76 114*   , No results found for this or any previous visit.    Discharge Medications   Current Discharge Medication List        START taking these medications    Details   atropine 1 % ophthalmic solution Place 1 drop Into the left eye 2 times daily  Qty: 15 mL, Refills: 0    Associated Diagnoses: Suprachoroidal hemorrhage of left eye      neomycin-polymyxin-dexAMETHasone (MAXITROL) 3.5-18108-2.1 ophthalmic ointment Place 0.1429 Applications (0.5 g) Into the left eye at bedtime  Qty: 3.5 g, Refills: 0    Associated Diagnoses: Suprachoroidal hemorrhage of left eye      ofloxacin (OCUFLOX) 0.3 % ophthalmic solution Place 1 drop Into the left eye 4 times daily  Qty: 10 mL, Refills: 0    Associated Diagnoses: Suprachoroidal hemorrhage of left eye           CONTINUE these medications which have CHANGED    Details   prednisoLONE acetate (PRED FORTE) 1 % ophthalmic suspension Place 1-2 drops Into the left eye every 2 hours  Qty: 15 mL, Refills: 0    Associated Diagnoses: Suprachoroidal hemorrhage of left eye           CONTINUE these medications which have NOT CHANGED    Details   calcium-vitamin D-vitamin K (VIACTIV) 500-500-40 MG-UNT-MCG CHEW Take 1 tablet by mouth daily      fexofenadine (ALLEGRA) 180 MG tablet Take 180 mg by mouth daily as needed for allergies      zinc gluconate 50 MG tablet Take 50 mg by mouth daily           STOP taking these medications       dorzolamide-timolol (COSOPT) 2-0.5 % ophthalmic solution Comments:   Reason for Stopping:             Allergies   Allergies   Allergen Reactions    Mildew     Mold

## 2024-05-24 ENCOUNTER — PATIENT OUTREACH (OUTPATIENT)
Dept: CARE COORDINATION | Facility: CLINIC | Age: 79
End: 2024-05-24
Payer: MEDICARE

## 2024-05-24 ENCOUNTER — OFFICE VISIT (OUTPATIENT)
Dept: OPHTHALMOLOGY | Facility: CLINIC | Age: 79
End: 2024-05-24
Attending: STUDENT IN AN ORGANIZED HEALTH CARE EDUCATION/TRAINING PROGRAM
Payer: MEDICARE

## 2024-05-24 DIAGNOSIS — Z98.890 POST-OPERATIVE STATE: Primary | ICD-10-CM

## 2024-05-24 LAB
ATRIAL RATE - MUSE: 52 BPM
DIASTOLIC BLOOD PRESSURE - MUSE: NORMAL MMHG
INTERPRETATION ECG - MUSE: NORMAL
P AXIS - MUSE: 39 DEGREES
PR INTERVAL - MUSE: 134 MS
QRS DURATION - MUSE: 134 MS
QT - MUSE: 458 MS
QTC - MUSE: 425 MS
R AXIS - MUSE: 48 DEGREES
SYSTOLIC BLOOD PRESSURE - MUSE: NORMAL MMHG
T AXIS - MUSE: 46 DEGREES
VENTRICULAR RATE- MUSE: 52 BPM

## 2024-05-24 PROCEDURE — G0463 HOSPITAL OUTPT CLINIC VISIT: HCPCS | Performed by: OPHTHALMOLOGY

## 2024-05-24 PROCEDURE — 99024 POSTOP FOLLOW-UP VISIT: CPT | Mod: GC | Performed by: OPHTHALMOLOGY

## 2024-05-24 ASSESSMENT — EXTERNAL EXAM - LEFT EYE: OS_EXAM: NORMAL

## 2024-05-24 ASSESSMENT — TONOMETRY
IOP_METHOD: ICARE
OD_IOP_MMHG: 16
OS_IOP_MMHG: 10

## 2024-05-24 ASSESSMENT — VISUAL ACUITY
METHOD: SNELLEN - LINEAR
OD_SC: 20/60
OD_SC+: -2+2
OS_SC: NLP

## 2024-05-24 ASSESSMENT — SLIT LAMP EXAM - LIDS: COMMENTS: NORMAL

## 2024-05-24 NOTE — PATIENT INSTRUCTIONS
use these drops    Atropine (red top) --- 1 time per day  Ofloxacin (tan top) --- 4 times per day until Monday, stop on Monday  prednisolone  --- use 4 times per day  for 2 weeks then 3 times per day for 2 weeks then 2 times per day for 2 weeks then 1 time per day for 2 weeks then stop    See your local eye doctor in 2 weeks

## 2024-05-24 NOTE — PROGRESS NOTES
Connected Care Resource Center: [facility] [place]    Background: Transitional Care Management program identified per system criteria and reviewed by Connected Care Resource Center team for possible outreach.    Assessment: Upon chart review, CCRC Team member will not proceed with patient outreach related to this episode of Transitional Care Management program due to reason below:    Patient has a follow up appointment with an appropriate provider today for hospital discharge    Plan: Transitional Care Management episode addressed appropriately per reason noted above.      MADISON Adam  [facility]    *Connected Care Resource Team does NOT follow patient ongoing. Referrals are identified based on internal discharge reports and the outreach is to ensure patient has an understanding of their discharge instructions.

## 2024-05-24 NOTE — PROGRESS NOTES
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 She has not had any vision improvement since surgery. She has some pain/pressure sensation but it is not like the pain she was having before surgery.      PAST OCULAR SURGERY  CE/IOL OU  PPV/MS/FGx OS 5/17/2024 ([place])  PPV/AC reformation/SO 5/20/24 ([place])    RETINAL IMAGING:  None       ASSESSMENT & PLAN    #POD #4  OS PPV/AC reformation/SO for gas overfill and RRD 5/20/24   - IOP OK, no view to retina, no infection   - NLP vision still, poor prognosis for vision recovery     - reduce PF to 4/day, advise taper q2 weeks (slower taper than typical given prior inflammation)   - reduce atropine to 1/day   - continue oflox until Monday then stop   - maxitrol ramiro qhs PRN     - continue shield qhs, activity restrictions x 1 week   - sleep on side   - will f/u locally given long distance and poor vision prognosis OS   - may elect to keep SO indefinitely unless complications from SO      # Amblyopia OD      # Dry AMD OU    # h/o FTMH OS      # PVD OD      Nicky Benitez MD  PGY3 Ophthalmology Resident  [facility]    ATTESTATION     Attending Attestation:     Complete documentation of historical and exam elements from today's encounter can be found in the full encounter summary report (not reduplicated in this progress note).  I personally obtained the chief complaint(s) and history of present illness.  I confirmed and edited as necessary the review of systems, past medical/surgical history, family history, social history, and examination findings as documented by others; and I examined the patient myself.  I personally reviewed the relevant tests, images, and reports as documented above.  I formulated and edited as necessary the assessment and  plan and discussed the findings and management plan with the patient and family    Silvana Pelaez MD, PhD  , Vitreoretinal Surgery  Department of Ophthalmology  [facility]

## 2024-05-24 NOTE — NURSING NOTE
Chief Complaints and History of Present Illnesses   Patient presents with    Post Op (Ophthalmology) Left Eye     POD3 s/p PPV/AC reformation/SO for gas overfill and RRD left eye      Chief Complaint(s) and History of Present Illness(es)       Post Op (Ophthalmology) Left Eye              Associated symptoms: Negative for eye pain, flashes and floaters    Treatments tried: eye drops and ointment    Pain scale: 0/10    Comments: POD3 s/p PPV/AC reformation/SO for gas overfill and RRD left eye               Comments    No VA changes since last visit.   Denies eye pain but occasionally feels pressure left eye    No flashes or floaters. Compliant with drops and ointment.  Has questions regarding face down and lifting weight restrictions.     Ocular Meds:  Prednisolone (white or pink top) every 2 hours right eye   Ofloxacin (tan top) 4/day right eye   Atropine (red top) 2/day right eye   Maxitrol ointment at night     Vito Ho 9:21 AM May 24, 2024

## 2024-06-10 ENCOUNTER — TRANSFERRED RECORDS (OUTPATIENT)
Dept: HEALTH INFORMATION MANAGEMENT | Facility: CLINIC | Age: 79
End: 2024-06-10
Payer: MEDICARE

## 2024-06-19 ENCOUNTER — TELEPHONE (OUTPATIENT)
Dept: OPHTHALMOLOGY | Facility: CLINIC | Age: 79
End: 2024-06-19

## 2024-06-19 NOTE — TELEPHONE ENCOUNTER
Health Call Center    Phone Message    May a detailed message be left on voicemail: yes     Reason for Call: [facility] [place] pt was seen 6/10/24 and is coming back on 7/16/24, caller is requesting a call to discuss co managing pts visits. Please contact Thania to discuss. Thank you # [phone]      Action Taken: Message routed to:  [facility] eye    Travel Screening: Not Applicable     Date of Service:

## (undated) DEVICE — Device

## (undated) DEVICE — EYE SOL BSS 500ML BAG 0065-1795-04

## (undated) DEVICE — EYE PACK 25GA CONSTELLATION 10,000 CPM PPK9380-04

## (undated) DEVICE — TAPE TRANSPORE 1"X1.5YD 1534S-1

## (undated) DEVICE — EYE CANN SOFT TIP 25GA FOR VALVED SET 8065149530

## (undated) DEVICE — SYR 05ML LL W/O NDL

## (undated) DEVICE — LINEN TOWEL PACK X5 5464

## (undated) DEVICE — EYE CANN IRR 20GA ACM LEWICKY 585061

## (undated) DEVICE — SU ETHILON 10-0 CS160-6 12" 9000G

## (undated) DEVICE — EYE PROBE ESU DIATHERMY DSP 25GA 339.21

## (undated) DEVICE — EYE DRAPE MICROSCOPE UNIVERSAL (BIOM FULL) 08-MK55140

## (undated) DEVICE — EYE PACK CONSTELLATION VFC 8065750957

## (undated) DEVICE — EYE PERFLUORON KIT 5ML 8065900163

## (undated) DEVICE — STRAP KNEE/BODY 31143004

## (undated) DEVICE — ESU CORD BIPOLAR GREEN 10-4000

## (undated) DEVICE — SYR 01ML LL W/O NDL LATEX FREE 309628

## (undated) DEVICE — NDL 18GA 1.5" 305196

## (undated) DEVICE — GLOVE BIOGEL PI MICRO SZ 7.5 48575

## (undated) DEVICE — EYE BLADE KNIFE BEAVER SCLEROTOME 375700

## (undated) DEVICE — SPONGE SPEAR WECK CEL 6/PKG 0008680

## (undated) DEVICE — POSITIONER ARMBOARD FOAM 1PAIR LF FP-ARMB1

## (undated) DEVICE — EYE CANN DUALBORE 25GA 3425

## (undated) DEVICE — EYE PROBE LASER 25GA FLEX RFID 8065751114

## (undated) DEVICE — SU PLAIN 6-0 TG140-8 18" 1735G

## (undated) DEVICE — EYE KNIFE STILETTO VISITEC 1.1MM ANG 45DEG SIDEPORT 376620

## (undated) DEVICE — TUBING IV SOLUTION SET MALE LL ADAPTER 125" 1C8296

## (undated) DEVICE — EYE CANN IRR 30GA  ANTERIOR CHAMBER 581273

## (undated) DEVICE — PACK VITRECTOMY/RET CUSTOM ASC PQ15VRU12

## (undated) DEVICE — EYE KIT AUTO GAS FOR CONSTELLATION 8065751014

## (undated) RX ORDER — APREPITANT 40 MG/1
CAPSULE ORAL
Status: DISPENSED
Start: 2024-05-20

## (undated) RX ORDER — PROPOFOL 10 MG/ML
INJECTION, EMULSION INTRAVENOUS
Status: DISPENSED
Start: 2024-05-20

## (undated) RX ORDER — ALBUTEROL SULFATE 0.83 MG/ML
SOLUTION RESPIRATORY (INHALATION)
Status: DISPENSED
Start: 2024-05-20

## (undated) RX ORDER — FENTANYL CITRATE 50 UG/ML
INJECTION, SOLUTION INTRAMUSCULAR; INTRAVENOUS
Status: DISPENSED
Start: 2024-05-20

## (undated) RX ORDER — ACETAMINOPHEN 325 MG/1
TABLET ORAL
Status: DISPENSED
Start: 2024-05-20

## (undated) RX ORDER — GLYCOPYRROLATE 0.2 MG/ML
INJECTION, SOLUTION INTRAMUSCULAR; INTRAVENOUS
Status: DISPENSED
Start: 2024-05-20

## (undated) RX ORDER — ALBUTEROL SULFATE 90 UG/1
AEROSOL, METERED RESPIRATORY (INHALATION)
Status: DISPENSED
Start: 2024-05-20